# Patient Record
Sex: FEMALE | Race: BLACK OR AFRICAN AMERICAN | NOT HISPANIC OR LATINO
[De-identification: names, ages, dates, MRNs, and addresses within clinical notes are randomized per-mention and may not be internally consistent; named-entity substitution may affect disease eponyms.]

---

## 2017-01-09 ENCOUNTER — APPOINTMENT (OUTPATIENT)
Dept: PLASTIC SURGERY | Facility: CLINIC | Age: 47
End: 2017-01-09

## 2017-01-20 ENCOUNTER — OUTPATIENT (OUTPATIENT)
Dept: OUTPATIENT SERVICES | Facility: HOSPITAL | Age: 47
LOS: 1 days | Discharge: HOME | End: 2017-01-20

## 2017-01-20 DIAGNOSIS — C50.912 MALIGNANT NEOPLASM OF UNSPECIFIED SITE OF LEFT FEMALE BREAST: ICD-10-CM

## 2017-01-20 DIAGNOSIS — Z90.13 ACQUIRED ABSENCE OF BILATERAL BREASTS AND NIPPLES: Chronic | ICD-10-CM

## 2017-01-23 ENCOUNTER — RESULT REVIEW (OUTPATIENT)
Age: 47
End: 2017-01-23

## 2017-01-24 ENCOUNTER — APPOINTMENT (OUTPATIENT)
Dept: HEMATOLOGY ONCOLOGY | Facility: CLINIC | Age: 47
End: 2017-01-24

## 2017-01-24 VITALS
RESPIRATION RATE: 16 BRPM | WEIGHT: 172.62 LBS | TEMPERATURE: 98.8 F | SYSTOLIC BLOOD PRESSURE: 134 MMHG | BODY MASS INDEX: 29.63 KG/M2 | DIASTOLIC BLOOD PRESSURE: 90 MMHG | HEART RATE: 76 BPM | OXYGEN SATURATION: 100 %

## 2017-01-24 LAB
BASOPHILS # BLD AUTO: 0 K/UL — SIGNIFICANT CHANGE UP (ref 0–0.2)
BASOPHILS NFR BLD AUTO: 0.2 % — SIGNIFICANT CHANGE UP (ref 0–2)
EOSINOPHIL # BLD AUTO: 0 K/UL — SIGNIFICANT CHANGE UP (ref 0–0.5)
EOSINOPHIL NFR BLD AUTO: 1.1 % — SIGNIFICANT CHANGE UP (ref 0–6)
HCT VFR BLD CALC: 38.2 % — SIGNIFICANT CHANGE UP (ref 34.5–45)
HGB BLD-MCNC: 13 G/DL — SIGNIFICANT CHANGE UP (ref 11.5–15.5)
LYMPHOCYTES # BLD AUTO: 1.4 K/UL — SIGNIFICANT CHANGE UP (ref 1–3.3)
LYMPHOCYTES # BLD AUTO: 32.4 % — SIGNIFICANT CHANGE UP (ref 13–44)
MCHC RBC-ENTMCNC: 26.4 PG — LOW (ref 27–34)
MCHC RBC-ENTMCNC: 34.1 GM/DL — SIGNIFICANT CHANGE UP (ref 32–36)
MCV RBC AUTO: 77.3 FL — LOW (ref 80–100)
MONOCYTES # BLD AUTO: 0.3 K/UL — SIGNIFICANT CHANGE UP (ref 0–0.9)
MONOCYTES NFR BLD AUTO: 7.4 % — SIGNIFICANT CHANGE UP (ref 2–14)
NEUTROPHILS # BLD AUTO: 2.6 K/UL — SIGNIFICANT CHANGE UP (ref 1.8–7.4)
NEUTROPHILS NFR BLD AUTO: 59 % — SIGNIFICANT CHANGE UP (ref 43–77)
PLATELET # BLD AUTO: 201 K/UL — SIGNIFICANT CHANGE UP (ref 150–400)
RBC # BLD: 4.94 M/UL — SIGNIFICANT CHANGE UP (ref 3.8–5.2)
RBC # FLD: 12 % — SIGNIFICANT CHANGE UP (ref 10.3–14.5)
WBC # BLD: 4.3 K/UL — SIGNIFICANT CHANGE UP (ref 3.8–10.5)
WBC # FLD AUTO: 4.3 K/UL — SIGNIFICANT CHANGE UP (ref 3.8–10.5)

## 2017-01-27 LAB
ALBUMIN SERPL ELPH-MCNC: 4.4 G/DL
ALP BLD-CCNC: 55 U/L
ALT SERPL-CCNC: 25 U/L
ANION GAP SERPL CALC-SCNC: 15 MMOL/L
AST SERPL-CCNC: 24 U/L
BILIRUB SERPL-MCNC: 0.4 MG/DL
BUN SERPL-MCNC: 8 MG/DL
CALCIUM SERPL-MCNC: 9.5 MG/DL
CHLORIDE SERPL-SCNC: 107 MMOL/L
CO2 SERPL-SCNC: 24 MMOL/L
CREAT SERPL-MCNC: 0.7 MG/DL
FERRITIN SERPL-MCNC: 72.2 NG/ML
GLUCOSE SERPL-MCNC: 85 MG/DL
IRON SATN MFR SERPL: 22 %
IRON SERPL-MCNC: 67 UG/DL
POTASSIUM SERPL-SCNC: 4 MMOL/L
PROT SERPL-MCNC: 8.1 G/DL
SODIUM SERPL-SCNC: 146 MMOL/L
TIBC SERPL-MCNC: 308 UG/DL
TRANSFERRIN SERPL-MCNC: 279 MG/DL
UIBC SERPL-MCNC: 241 UG/DL

## 2017-04-05 ENCOUNTER — FORM ENCOUNTER (OUTPATIENT)
Age: 47
End: 2017-04-05

## 2017-04-06 ENCOUNTER — APPOINTMENT (OUTPATIENT)
Dept: SURGICAL ONCOLOGY | Facility: CLINIC | Age: 47
End: 2017-04-06

## 2017-04-06 ENCOUNTER — APPOINTMENT (OUTPATIENT)
Dept: RADIOLOGY | Facility: IMAGING CENTER | Age: 47
End: 2017-04-06

## 2017-04-06 ENCOUNTER — OUTPATIENT (OUTPATIENT)
Dept: OUTPATIENT SERVICES | Facility: HOSPITAL | Age: 47
LOS: 1 days | End: 2017-04-06
Payer: COMMERCIAL

## 2017-04-06 VITALS
WEIGHT: 170 LBS | DIASTOLIC BLOOD PRESSURE: 95 MMHG | SYSTOLIC BLOOD PRESSURE: 144 MMHG | HEART RATE: 79 BPM | BODY MASS INDEX: 27.32 KG/M2 | HEIGHT: 66 IN

## 2017-04-06 DIAGNOSIS — C50.912 MALIGNANT NEOPLASM OF UNSPECIFIED SITE OF LEFT FEMALE BREAST: ICD-10-CM

## 2017-04-06 DIAGNOSIS — Z90.13 ACQUIRED ABSENCE OF BILATERAL BREASTS AND NIPPLES: Chronic | ICD-10-CM

## 2017-04-06 PROCEDURE — 77080 DXA BONE DENSITY AXIAL: CPT

## 2017-04-28 ENCOUNTER — OUTPATIENT (OUTPATIENT)
Dept: OUTPATIENT SERVICES | Facility: HOSPITAL | Age: 47
LOS: 1 days | Discharge: HOME | End: 2017-04-28

## 2017-04-28 DIAGNOSIS — C50.912 MALIGNANT NEOPLASM OF UNSPECIFIED SITE OF LEFT FEMALE BREAST: ICD-10-CM

## 2017-04-28 DIAGNOSIS — Z90.13 ACQUIRED ABSENCE OF BILATERAL BREASTS AND NIPPLES: Chronic | ICD-10-CM

## 2017-05-02 ENCOUNTER — RESULT REVIEW (OUTPATIENT)
Age: 47
End: 2017-05-02

## 2017-05-03 ENCOUNTER — APPOINTMENT (OUTPATIENT)
Dept: HEMATOLOGY ONCOLOGY | Facility: CLINIC | Age: 47
End: 2017-05-03

## 2017-05-03 VITALS
SYSTOLIC BLOOD PRESSURE: 138 MMHG | BODY MASS INDEX: 28 KG/M2 | WEIGHT: 173.5 LBS | RESPIRATION RATE: 16 BRPM | DIASTOLIC BLOOD PRESSURE: 92 MMHG | OXYGEN SATURATION: 95 % | TEMPERATURE: 98.6 F | HEART RATE: 76 BPM

## 2017-05-03 DIAGNOSIS — Z51.81 ENCOUNTER FOR THERAPEUTIC DRUG LVL MONITORING: ICD-10-CM

## 2017-05-03 DIAGNOSIS — Z79.810 ENCOUNTER FOR THERAPEUTIC DRUG LVL MONITORING: ICD-10-CM

## 2017-05-03 DIAGNOSIS — Z85.3 ENCOUNTER FOR FOLLOW-UP EXAMINATION AFTER COMPLETED TREATMENT FOR MALIGNANT NEOPLASM: ICD-10-CM

## 2017-05-03 DIAGNOSIS — Z08 ENCOUNTER FOR FOLLOW-UP EXAMINATION AFTER COMPLETED TREATMENT FOR MALIGNANT NEOPLASM: ICD-10-CM

## 2017-05-03 LAB
ALBUMIN SERPL ELPH-MCNC: 4.1 G/DL
ALP BLD-CCNC: 51 U/L
ALT SERPL-CCNC: 11 U/L
ANION GAP SERPL CALC-SCNC: 12 MMOL/L
AST SERPL-CCNC: 17 U/L
BASOPHILS # BLD AUTO: 0 K/UL — SIGNIFICANT CHANGE UP (ref 0–0.2)
BASOPHILS NFR BLD AUTO: 0.3 % — SIGNIFICANT CHANGE UP (ref 0–2)
BILIRUB SERPL-MCNC: 0.3 MG/DL
BUN SERPL-MCNC: 8 MG/DL
CALCIUM SERPL-MCNC: 9.3 MG/DL
CHLORIDE SERPL-SCNC: 106 MMOL/L
CO2 SERPL-SCNC: 26 MMOL/L
CREAT SERPL-MCNC: 0.69 MG/DL
EOSINOPHIL # BLD AUTO: 0.1 K/UL — SIGNIFICANT CHANGE UP (ref 0–0.5)
EOSINOPHIL NFR BLD AUTO: 2.1 % — SIGNIFICANT CHANGE UP (ref 0–6)
GLUCOSE SERPL-MCNC: 92 MG/DL
HCT VFR BLD CALC: 35.8 % — SIGNIFICANT CHANGE UP (ref 34.5–45)
HGB BLD-MCNC: 12.2 G/DL — SIGNIFICANT CHANGE UP (ref 11.5–15.5)
LYMPHOCYTES # BLD AUTO: 1.3 K/UL — SIGNIFICANT CHANGE UP (ref 1–3.3)
LYMPHOCYTES # BLD AUTO: 35.6 % — SIGNIFICANT CHANGE UP (ref 13–44)
MCHC RBC-ENTMCNC: 26.2 PG — LOW (ref 27–34)
MCHC RBC-ENTMCNC: 34 G/DL — SIGNIFICANT CHANGE UP (ref 32–36)
MCV RBC AUTO: 77.2 FL — LOW (ref 80–100)
MONOCYTES # BLD AUTO: 0.4 K/UL — SIGNIFICANT CHANGE UP (ref 0–0.9)
MONOCYTES NFR BLD AUTO: 9.7 % — SIGNIFICANT CHANGE UP (ref 2–14)
NEUTROPHILS # BLD AUTO: 2 K/UL — SIGNIFICANT CHANGE UP (ref 1.8–7.4)
NEUTROPHILS NFR BLD AUTO: 52.3 % — SIGNIFICANT CHANGE UP (ref 43–77)
PLATELET # BLD AUTO: 223 K/UL — SIGNIFICANT CHANGE UP (ref 150–400)
POTASSIUM SERPL-SCNC: 4.2 MMOL/L
PROT SERPL-MCNC: 7.8 G/DL
RBC # BLD: 4.63 M/UL — SIGNIFICANT CHANGE UP (ref 3.8–5.2)
RBC # FLD: 12.3 % — SIGNIFICANT CHANGE UP (ref 10.3–14.5)
SODIUM SERPL-SCNC: 144 MMOL/L
WBC # BLD: 3.8 K/UL — SIGNIFICANT CHANGE UP (ref 3.8–10.5)
WBC # FLD AUTO: 3.8 K/UL — SIGNIFICANT CHANGE UP (ref 3.8–10.5)

## 2017-05-08 ENCOUNTER — APPOINTMENT (OUTPATIENT)
Dept: OBGYN | Facility: CLINIC | Age: 47
End: 2017-05-08

## 2017-05-08 VITALS
HEIGHT: 66 IN | WEIGHT: 180 LBS | DIASTOLIC BLOOD PRESSURE: 103 MMHG | SYSTOLIC BLOOD PRESSURE: 155 MMHG | BODY MASS INDEX: 28.93 KG/M2 | HEART RATE: 82 BPM

## 2017-05-09 LAB
FSH SERPL-MCNC: 29.2 IU/L
LH SERPL-ACNC: 17.4 IU/L
TSH SERPL-ACNC: 1.38 UIU/ML

## 2017-05-10 LAB — HPV HIGH+LOW RISK DNA PNL CVX: NEGATIVE

## 2017-05-12 LAB — CYTOLOGY CVX/VAG DOC THIN PREP: NORMAL

## 2017-07-03 ENCOUNTER — APPOINTMENT (OUTPATIENT)
Dept: PLASTIC SURGERY | Facility: CLINIC | Age: 47
End: 2017-07-03

## 2017-09-07 ENCOUNTER — OUTPATIENT (OUTPATIENT)
Dept: OUTPATIENT SERVICES | Facility: HOSPITAL | Age: 47
LOS: 1 days | Discharge: HOME | End: 2017-09-07

## 2017-09-07 DIAGNOSIS — C50.912 MALIGNANT NEOPLASM OF UNSPECIFIED SITE OF LEFT FEMALE BREAST: ICD-10-CM

## 2017-09-07 DIAGNOSIS — Z90.13 ACQUIRED ABSENCE OF BILATERAL BREASTS AND NIPPLES: Chronic | ICD-10-CM

## 2017-09-08 ENCOUNTER — APPOINTMENT (OUTPATIENT)
Dept: HEMATOLOGY ONCOLOGY | Facility: CLINIC | Age: 47
End: 2017-09-08
Payer: COMMERCIAL

## 2017-09-08 ENCOUNTER — RESULT REVIEW (OUTPATIENT)
Age: 47
End: 2017-09-08

## 2017-09-08 VITALS
SYSTOLIC BLOOD PRESSURE: 119 MMHG | TEMPERATURE: 98.9 F | HEART RATE: 78 BPM | BODY MASS INDEX: 27.43 KG/M2 | WEIGHT: 169.98 LBS | DIASTOLIC BLOOD PRESSURE: 82 MMHG | RESPIRATION RATE: 16 BRPM | OXYGEN SATURATION: 100 %

## 2017-09-08 LAB
BASOPHILS # BLD AUTO: 0 K/UL — SIGNIFICANT CHANGE UP (ref 0–0.2)
BASOPHILS NFR BLD AUTO: 0.2 % — SIGNIFICANT CHANGE UP (ref 0–2)
EOSINOPHIL # BLD AUTO: 0.1 K/UL — SIGNIFICANT CHANGE UP (ref 0–0.5)
EOSINOPHIL NFR BLD AUTO: 1.4 % — SIGNIFICANT CHANGE UP (ref 0–6)
HCT VFR BLD CALC: 36.3 % — SIGNIFICANT CHANGE UP (ref 34.5–45)
HGB BLD-MCNC: 12.5 G/DL — SIGNIFICANT CHANGE UP (ref 11.5–15.5)
LYMPHOCYTES # BLD AUTO: 1.8 K/UL — SIGNIFICANT CHANGE UP (ref 1–3.3)
LYMPHOCYTES # BLD AUTO: 38.7 % — SIGNIFICANT CHANGE UP (ref 13–44)
MCHC RBC-ENTMCNC: 26.8 PG — LOW (ref 27–34)
MCHC RBC-ENTMCNC: 34.4 G/DL — SIGNIFICANT CHANGE UP (ref 32–36)
MCV RBC AUTO: 77.7 FL — LOW (ref 80–100)
MONOCYTES # BLD AUTO: 0.4 K/UL — SIGNIFICANT CHANGE UP (ref 0–0.9)
MONOCYTES NFR BLD AUTO: 9 % — SIGNIFICANT CHANGE UP (ref 2–14)
NEUTROPHILS # BLD AUTO: 2.3 K/UL — SIGNIFICANT CHANGE UP (ref 1.8–7.4)
NEUTROPHILS NFR BLD AUTO: 50.7 % — SIGNIFICANT CHANGE UP (ref 43–77)
PLATELET # BLD AUTO: 216 K/UL — SIGNIFICANT CHANGE UP (ref 150–400)
RBC # BLD: 4.66 M/UL — SIGNIFICANT CHANGE UP (ref 3.8–5.2)
RBC # FLD: 12.4 % — SIGNIFICANT CHANGE UP (ref 10.3–14.5)
WBC # BLD: 4.5 K/UL — SIGNIFICANT CHANGE UP (ref 3.8–10.5)
WBC # FLD AUTO: 4.5 K/UL — SIGNIFICANT CHANGE UP (ref 3.8–10.5)

## 2017-09-08 PROCEDURE — 99215 OFFICE O/P EST HI 40 MIN: CPT

## 2017-09-08 RX ORDER — IBUPROFEN 600 MG/1
600 TABLET, FILM COATED ORAL
Qty: 9 | Refills: 0 | Status: DISCONTINUED | COMMUNITY
Start: 2017-05-27

## 2017-09-08 RX ORDER — ERGOCALCIFEROL 1.25 MG/1
1.25 MG CAPSULE, LIQUID FILLED ORAL
Qty: 13 | Refills: 0 | Status: DISCONTINUED | COMMUNITY
Start: 2017-08-11

## 2017-09-12 LAB
25(OH)D3 SERPL-MCNC: 33.1 NG/ML
ALBUMIN SERPL ELPH-MCNC: 4.1 G/DL
ALP BLD-CCNC: 46 U/L
ALT SERPL-CCNC: 9 U/L
ANION GAP SERPL CALC-SCNC: 13 MMOL/L
AST SERPL-CCNC: 17 U/L
BILIRUB SERPL-MCNC: 0.3 MG/DL
BUN SERPL-MCNC: 7 MG/DL
CALCIUM SERPL-MCNC: 9.4 MG/DL
CHLORIDE SERPL-SCNC: 104 MMOL/L
CO2 SERPL-SCNC: 28 MMOL/L
CREAT SERPL-MCNC: 0.67 MG/DL
GLUCOSE SERPL-MCNC: 84 MG/DL
POTASSIUM SERPL-SCNC: 3.9 MMOL/L
PROT SERPL-MCNC: 7.9 G/DL
SODIUM SERPL-SCNC: 145 MMOL/L

## 2018-02-27 ENCOUNTER — OUTPATIENT (OUTPATIENT)
Dept: OUTPATIENT SERVICES | Facility: HOSPITAL | Age: 48
LOS: 1 days | Discharge: HOME | End: 2018-02-27

## 2018-02-27 DIAGNOSIS — Z90.13 ACQUIRED ABSENCE OF BILATERAL BREASTS AND NIPPLES: Chronic | ICD-10-CM

## 2018-02-27 DIAGNOSIS — C50.912 MALIGNANT NEOPLASM OF UNSPECIFIED SITE OF LEFT FEMALE BREAST: ICD-10-CM

## 2018-03-05 ENCOUNTER — RESULT REVIEW (OUTPATIENT)
Age: 48
End: 2018-03-05

## 2018-03-05 ENCOUNTER — APPOINTMENT (OUTPATIENT)
Dept: HEMATOLOGY ONCOLOGY | Facility: CLINIC | Age: 48
End: 2018-03-05
Payer: COMMERCIAL

## 2018-03-05 VITALS
SYSTOLIC BLOOD PRESSURE: 130 MMHG | OXYGEN SATURATION: 98 % | RESPIRATION RATE: 16 BRPM | TEMPERATURE: 99 F | WEIGHT: 164.46 LBS | DIASTOLIC BLOOD PRESSURE: 76 MMHG | BODY MASS INDEX: 26.55 KG/M2 | HEART RATE: 76 BPM

## 2018-03-05 LAB
BASOPHILS # BLD AUTO: 0 K/UL — SIGNIFICANT CHANGE UP (ref 0–0.2)
BASOPHILS NFR BLD AUTO: 0.8 % — SIGNIFICANT CHANGE UP (ref 0–2)
EOSINOPHIL # BLD AUTO: 0.1 K/UL — SIGNIFICANT CHANGE UP (ref 0–0.5)
EOSINOPHIL NFR BLD AUTO: 2.1 % — SIGNIFICANT CHANGE UP (ref 0–6)
HCT VFR BLD CALC: 36.4 % — SIGNIFICANT CHANGE UP (ref 34.5–45)
HGB BLD-MCNC: 12.4 G/DL — SIGNIFICANT CHANGE UP (ref 11.5–15.5)
LYMPHOCYTES # BLD AUTO: 1.7 K/UL — SIGNIFICANT CHANGE UP (ref 1–3.3)
LYMPHOCYTES # BLD AUTO: 40.1 % — SIGNIFICANT CHANGE UP (ref 13–44)
MCHC RBC-ENTMCNC: 26.4 PG — LOW (ref 27–34)
MCHC RBC-ENTMCNC: 34.1 G/DL — SIGNIFICANT CHANGE UP (ref 32–36)
MCV RBC AUTO: 77.4 FL — LOW (ref 80–100)
MONOCYTES # BLD AUTO: 0.3 K/UL — SIGNIFICANT CHANGE UP (ref 0–0.9)
MONOCYTES NFR BLD AUTO: 6.3 % — SIGNIFICANT CHANGE UP (ref 2–14)
NEUTROPHILS # BLD AUTO: 2.2 K/UL — SIGNIFICANT CHANGE UP (ref 1.8–7.4)
NEUTROPHILS NFR BLD AUTO: 50.8 % — SIGNIFICANT CHANGE UP (ref 43–77)
PLATELET # BLD AUTO: 214 K/UL — SIGNIFICANT CHANGE UP (ref 150–400)
RBC # BLD: 4.7 M/UL — SIGNIFICANT CHANGE UP (ref 3.8–5.2)
RBC # FLD: 12.1 % — SIGNIFICANT CHANGE UP (ref 10.3–14.5)
WBC # BLD: 4.3 K/UL — SIGNIFICANT CHANGE UP (ref 3.8–10.5)
WBC # FLD AUTO: 4.3 K/UL — SIGNIFICANT CHANGE UP (ref 3.8–10.5)

## 2018-03-05 PROCEDURE — 99215 OFFICE O/P EST HI 40 MIN: CPT

## 2018-03-07 LAB
25(OH)D3 SERPL-MCNC: 25.4 NG/ML
ALBUMIN SERPL ELPH-MCNC: 4.3 G/DL
ALP BLD-CCNC: 51 U/L
ALT SERPL-CCNC: 10 U/L
ANION GAP SERPL CALC-SCNC: 19 MMOL/L
AST SERPL-CCNC: 17 U/L
BILIRUB SERPL-MCNC: 0.3 MG/DL
BUN SERPL-MCNC: 11 MG/DL
CALCIUM SERPL-MCNC: 10 MG/DL
CHLORIDE SERPL-SCNC: 104 MMOL/L
CO2 SERPL-SCNC: 22 MMOL/L
CREAT SERPL-MCNC: 0.84 MG/DL
ESTRADIOL SERPL-MCNC: <5 PG/ML
FSH SERPL-MCNC: 29 IU/L
GLUCOSE SERPL-MCNC: 87 MG/DL
POTASSIUM SERPL-SCNC: 4.2 MMOL/L
PROT SERPL-MCNC: 8.1 G/DL
SODIUM SERPL-SCNC: 145 MMOL/L

## 2018-03-15 LAB
APPEARANCE: CLEAR
BILIRUBIN URINE: NEGATIVE
BLOOD URINE: NEGATIVE
COLOR: YELLOW
GLUCOSE QUALITATIVE U: NEGATIVE MG/DL
KETONES URINE: ABNORMAL
LEUKOCYTE ESTERASE URINE: NEGATIVE
NITRITE URINE: NEGATIVE
PH URINE: 6.5
PROTEIN URINE: NEGATIVE MG/DL
SPECIFIC GRAVITY URINE: 1.02
UROBILINOGEN URINE: 1 MG/DL

## 2018-04-04 ENCOUNTER — APPOINTMENT (OUTPATIENT)
Dept: OBGYN | Facility: CLINIC | Age: 48
End: 2018-04-04
Payer: COMMERCIAL

## 2018-04-04 VITALS
BODY MASS INDEX: 26.36 KG/M2 | DIASTOLIC BLOOD PRESSURE: 91 MMHG | HEART RATE: 79 BPM | SYSTOLIC BLOOD PRESSURE: 142 MMHG | HEIGHT: 66 IN | WEIGHT: 164 LBS

## 2018-04-04 PROCEDURE — 99213 OFFICE O/P EST LOW 20 MIN: CPT

## 2018-04-07 LAB — BACTERIA UR CULT: NORMAL

## 2018-05-18 ENCOUNTER — APPOINTMENT (OUTPATIENT)
Dept: OBGYN | Facility: CLINIC | Age: 48
End: 2018-05-18
Payer: COMMERCIAL

## 2018-05-18 DIAGNOSIS — R19.8 OTHER SPECIFIED SYMPTOMS AND SIGNS INVOLVING THE DIGESTIVE SYSTEM AND ABDOMEN: ICD-10-CM

## 2018-05-18 DIAGNOSIS — K59.09 OTHER CONSTIPATION: ICD-10-CM

## 2018-05-18 PROCEDURE — 99214 OFFICE O/P EST MOD 30 MIN: CPT | Mod: 25

## 2018-05-18 PROCEDURE — 51798 US URINE CAPACITY MEASURE: CPT

## 2018-05-19 LAB
APPEARANCE: CLEAR
BACTERIA: NEGATIVE
BILIRUBIN URINE: NEGATIVE
BLOOD URINE: NEGATIVE
COLOR: YELLOW
GLUCOSE QUALITATIVE U: NEGATIVE MG/DL
KETONES URINE: NEGATIVE
LEUKOCYTE ESTERASE URINE: NEGATIVE
MICROSCOPIC-UA: NORMAL
NITRITE URINE: NEGATIVE
PH URINE: 6
PROTEIN URINE: NEGATIVE MG/DL
RED BLOOD CELLS URINE: 6 /HPF
SPECIFIC GRAVITY URINE: 1.02
SQUAMOUS EPITHELIAL CELLS: 0 /HPF
UROBILINOGEN URINE: NEGATIVE MG/DL
WHITE BLOOD CELLS URINE: 0 /HPF

## 2018-05-21 LAB — CORE LAB FLUID CYTOLOGY: NORMAL

## 2018-06-06 ENCOUNTER — APPOINTMENT (OUTPATIENT)
Dept: ULTRASOUND IMAGING | Facility: IMAGING CENTER | Age: 48
End: 2018-06-06
Payer: COMMERCIAL

## 2018-06-06 ENCOUNTER — OUTPATIENT (OUTPATIENT)
Dept: OUTPATIENT SERVICES | Facility: HOSPITAL | Age: 48
LOS: 1 days | End: 2018-06-06
Payer: COMMERCIAL

## 2018-06-06 DIAGNOSIS — N31.8 OTHER NEUROMUSCULAR DYSFUNCTION OF BLADDER: ICD-10-CM

## 2018-06-06 DIAGNOSIS — Z90.13 ACQUIRED ABSENCE OF BILATERAL BREASTS AND NIPPLES: Chronic | ICD-10-CM

## 2018-06-06 PROCEDURE — 76700 US EXAM ABDOM COMPLETE: CPT

## 2018-06-06 PROCEDURE — 76700 US EXAM ABDOM COMPLETE: CPT | Mod: 26

## 2018-06-06 PROCEDURE — 76856 US EXAM PELVIC COMPLETE: CPT

## 2018-06-06 PROCEDURE — 76830 TRANSVAGINAL US NON-OB: CPT | Mod: 26

## 2018-06-06 PROCEDURE — 76856 US EXAM PELVIC COMPLETE: CPT | Mod: 26,59

## 2018-06-06 PROCEDURE — 76830 TRANSVAGINAL US NON-OB: CPT

## 2018-07-06 ENCOUNTER — APPOINTMENT (OUTPATIENT)
Dept: OBGYN | Facility: CLINIC | Age: 48
End: 2018-07-06
Payer: COMMERCIAL

## 2018-07-06 VITALS
DIASTOLIC BLOOD PRESSURE: 90 MMHG | SYSTOLIC BLOOD PRESSURE: 130 MMHG | HEART RATE: 86 BPM | HEIGHT: 66 IN | WEIGHT: 168 LBS | BODY MASS INDEX: 27 KG/M2

## 2018-07-06 DIAGNOSIS — N31.8 OTHER NEUROMUSCULAR DYSFUNCTION OF BLADDER: ICD-10-CM

## 2018-07-06 PROCEDURE — 51798 US URINE CAPACITY MEASURE: CPT

## 2018-07-06 PROCEDURE — 99214 OFFICE O/P EST MOD 30 MIN: CPT | Mod: 25

## 2018-07-07 LAB
APPEARANCE: CLEAR
BACTERIA: NEGATIVE
BILIRUBIN URINE: NEGATIVE
BLOOD URINE: NEGATIVE
COLOR: YELLOW
GLUCOSE QUALITATIVE U: NEGATIVE MG/DL
HYALINE CASTS: 2 /LPF
KETONES URINE: NEGATIVE
LEUKOCYTE ESTERASE URINE: NEGATIVE
MICROSCOPIC-UA: NORMAL
NITRITE URINE: NEGATIVE
PH URINE: 5.5
PROTEIN URINE: NEGATIVE MG/DL
RED BLOOD CELLS URINE: 4 /HPF
SPECIFIC GRAVITY URINE: 1.02
SQUAMOUS EPITHELIAL CELLS: 1 /HPF
UROBILINOGEN URINE: NEGATIVE MG/DL
WHITE BLOOD CELLS URINE: 1 /HPF

## 2018-07-08 LAB — BACTERIA UR CULT: NORMAL

## 2018-07-09 LAB — CORE LAB FLUID CYTOLOGY: NORMAL

## 2018-08-29 ENCOUNTER — OUTPATIENT (OUTPATIENT)
Dept: OUTPATIENT SERVICES | Facility: HOSPITAL | Age: 48
LOS: 1 days | Discharge: HOME | End: 2018-08-29

## 2018-08-29 DIAGNOSIS — C50.912 MALIGNANT NEOPLASM OF UNSPECIFIED SITE OF LEFT FEMALE BREAST: ICD-10-CM

## 2018-08-29 DIAGNOSIS — Z90.13 ACQUIRED ABSENCE OF BILATERAL BREASTS AND NIPPLES: Chronic | ICD-10-CM

## 2018-09-07 ENCOUNTER — APPOINTMENT (OUTPATIENT)
Dept: HEMATOLOGY ONCOLOGY | Facility: CLINIC | Age: 48
End: 2018-09-07

## 2018-09-28 ENCOUNTER — APPOINTMENT (OUTPATIENT)
Dept: HEMATOLOGY ONCOLOGY | Facility: CLINIC | Age: 48
End: 2018-09-28
Payer: COMMERCIAL

## 2018-09-28 ENCOUNTER — RESULT REVIEW (OUTPATIENT)
Age: 48
End: 2018-09-28

## 2018-09-28 VITALS
DIASTOLIC BLOOD PRESSURE: 85 MMHG | BODY MASS INDEX: 27.04 KG/M2 | OXYGEN SATURATION: 99 % | HEART RATE: 76 BPM | RESPIRATION RATE: 16 BRPM | SYSTOLIC BLOOD PRESSURE: 129 MMHG | TEMPERATURE: 97.9 F | WEIGHT: 167.55 LBS

## 2018-09-28 DIAGNOSIS — Z17.0 MALIGNANT NEOPLASM OF UNSPECIFIED SITE OF LEFT FEMALE BREAST: ICD-10-CM

## 2018-09-28 DIAGNOSIS — C50.912 MALIGNANT NEOPLASM OF UNSPECIFIED SITE OF LEFT FEMALE BREAST: ICD-10-CM

## 2018-09-28 LAB
BASOPHILS # BLD AUTO: 0 K/UL — SIGNIFICANT CHANGE UP (ref 0–0.2)
BASOPHILS NFR BLD AUTO: 0.3 % — SIGNIFICANT CHANGE UP (ref 0–2)
EOSINOPHIL # BLD AUTO: 0.2 K/UL — SIGNIFICANT CHANGE UP (ref 0–0.5)
EOSINOPHIL NFR BLD AUTO: 3.6 % — SIGNIFICANT CHANGE UP (ref 0–6)
HCT VFR BLD CALC: 37.3 % — SIGNIFICANT CHANGE UP (ref 34.5–45)
HGB BLD-MCNC: 12.2 G/DL — SIGNIFICANT CHANGE UP (ref 11.5–15.5)
LYMPHOCYTES # BLD AUTO: 2 K/UL — SIGNIFICANT CHANGE UP (ref 1–3.3)
LYMPHOCYTES # BLD AUTO: 39.4 % — SIGNIFICANT CHANGE UP (ref 13–44)
MCHC RBC-ENTMCNC: 25 PG — LOW (ref 27–34)
MCHC RBC-ENTMCNC: 32.6 G/DL — SIGNIFICANT CHANGE UP (ref 32–36)
MCV RBC AUTO: 76.6 FL — LOW (ref 80–100)
MONOCYTES # BLD AUTO: 0.3 K/UL — SIGNIFICANT CHANGE UP (ref 0–0.9)
MONOCYTES NFR BLD AUTO: 6.4 % — SIGNIFICANT CHANGE UP (ref 2–14)
NEUTROPHILS # BLD AUTO: 2.6 K/UL — SIGNIFICANT CHANGE UP (ref 1.8–7.4)
NEUTROPHILS NFR BLD AUTO: 50.4 % — SIGNIFICANT CHANGE UP (ref 43–77)
PLATELET # BLD AUTO: 209 K/UL — SIGNIFICANT CHANGE UP (ref 150–400)
RBC # BLD: 4.87 M/UL — SIGNIFICANT CHANGE UP (ref 3.8–5.2)
RBC # FLD: 12.1 % — SIGNIFICANT CHANGE UP (ref 10.3–14.5)
WBC # BLD: 5.1 K/UL — SIGNIFICANT CHANGE UP (ref 3.8–10.5)
WBC # FLD AUTO: 5.1 K/UL — SIGNIFICANT CHANGE UP (ref 3.8–10.5)

## 2018-09-28 PROCEDURE — 99215 OFFICE O/P EST HI 40 MIN: CPT

## 2018-09-28 RX ORDER — TAMOXIFEN CITRATE 20 MG/1
20 TABLET, FILM COATED ORAL
Qty: 90 | Refills: 1 | Status: DISCONTINUED | COMMUNITY
Start: 2017-01-24 | End: 2018-09-28

## 2018-10-01 LAB
25(OH)D3 SERPL-MCNC: 25.3 NG/ML
ALBUMIN SERPL ELPH-MCNC: 4.5 G/DL
ALP BLD-CCNC: 49 U/L
ALT SERPL-CCNC: 11 U/L
ANION GAP SERPL CALC-SCNC: 14 MMOL/L
AST SERPL-CCNC: 20 U/L
BILIRUB SERPL-MCNC: 0.2 MG/DL
BUN SERPL-MCNC: 8 MG/DL
CALCIUM SERPL-MCNC: 9.3 MG/DL
CHLORIDE SERPL-SCNC: 105 MMOL/L
CO2 SERPL-SCNC: 26 MMOL/L
CREAT SERPL-MCNC: 0.75 MG/DL
GLUCOSE SERPL-MCNC: 94 MG/DL
POTASSIUM SERPL-SCNC: 3.9 MMOL/L
PROT SERPL-MCNC: 7.8 G/DL
SODIUM SERPL-SCNC: 145 MMOL/L

## 2019-01-10 ENCOUNTER — OUTPATIENT (OUTPATIENT)
Dept: OUTPATIENT SERVICES | Facility: HOSPITAL | Age: 49
LOS: 1 days | Discharge: HOME | End: 2019-01-10

## 2019-01-10 DIAGNOSIS — C50.912 MALIGNANT NEOPLASM OF UNSPECIFIED SITE OF LEFT FEMALE BREAST: ICD-10-CM

## 2019-01-10 DIAGNOSIS — Z90.13 ACQUIRED ABSENCE OF BILATERAL BREASTS AND NIPPLES: Chronic | ICD-10-CM

## 2019-01-14 ENCOUNTER — APPOINTMENT (OUTPATIENT)
Dept: HEMATOLOGY ONCOLOGY | Facility: CLINIC | Age: 49
End: 2019-01-14

## 2019-01-14 ENCOUNTER — APPOINTMENT (OUTPATIENT)
Dept: HEMATOLOGY ONCOLOGY | Facility: CLINIC | Age: 49
End: 2019-01-14
Payer: COMMERCIAL

## 2019-01-14 VITALS
TEMPERATURE: 99 F | SYSTOLIC BLOOD PRESSURE: 142 MMHG | OXYGEN SATURATION: 100 % | DIASTOLIC BLOOD PRESSURE: 91 MMHG | BODY MASS INDEX: 27.28 KG/M2 | RESPIRATION RATE: 16 BRPM | HEART RATE: 77 BPM | WEIGHT: 168.98 LBS

## 2019-01-14 DIAGNOSIS — D05.12 INTRADUCTAL CARCINOMA IN SITU OF LEFT BREAST: ICD-10-CM

## 2019-01-14 PROCEDURE — 99215 OFFICE O/P EST HI 40 MIN: CPT

## 2019-01-17 NOTE — REASON FOR VISIT
[Follow-Up Visit] : a follow-up [Parent] : parent [FreeTextEntry2] : breast cancer ER/TX Positive, HER-2 NEGATIVE

## 2019-01-17 NOTE — ASSESSMENT
[Curative] : Goals of care discussed with patient: Curative [FreeTextEntry1] : Ms. Bermeo is a 49 y/o female with stage IA ([id number]) moderately differentiated ER+/TN+/HER2- invasive ductal carcinoma of the left breast s/p bilateral mastectomy, SLNB and ZANDER reconstruction 1/18/16, Oncotype 32, completed adjuvant chemotherapy with TCx4, on tamoxifen (8/1/16-9/2018), switched to anastrozole 9/2018, here for f/u. \par \par -  Breast ca- on anastrozole but reporting side effects- pain and hot flashes. She has Aromatase inhibitor induced arthralgia : Severe pain interfering with daily activities. I suspect it is secondary to anastrozole. I recommend 3 weeks break and see me in 3 weeks to discuss further plan. If pain subsides, would switch to exemestane. If pain doesn’t subside, she would benefit from ortho intervention. Would obtain a bone scan to r/o possibility of bone mets ( less likely) if pain doesn’t subside. \par - DEXA every 2 yrs. Continue ca+ vit D\par - Lipid profile annually with PCP\par - GYN f/u due to h/o tamoxifen use.\par - No breast imaging needed\par - Continue to followup with primary care for HTN control\par \par RTC 3w

## 2019-01-17 NOTE — HISTORY OF PRESENT ILLNESS
[Disease: _____________________] : Disease: [unfilled] [T: ___] : T[unfilled] [N: ___] : N[unfilled] [M: ___] : M[unfilled] [AJCC Stage: ____] : AJCC Stage: [unfilled] [IA] : IA [de-identified] : Ms. Bermeo is a 47 y/o female with stage IA invasive ductal carcinoma of left breast, ER+/CT+/HER2- s/p bilateral mastectomy and adjuvant chemotherapy with TC x4 here for f/u. Her oncologic history is as follows: \par \par She underwent a screening mammography on 10/16/2015 because of a lump she had palpated in the left breast, which revealed two clusters of heterogeneous microcalcifications in the left breast.  This was followed by an ultrasound  which revealed a 0.9 x 1.3 x 0.6cm poorly marginated/microlobulated, heterogeneously hypoechoic nodule in the left breast, directly beneath the region of interest of the palpable lump, 9:00 position 1 cm from the nipple. She underwent a core biopsy of the nodule on 11/4/2015 which revealed DCIS, solid and cribriform patterns, high grade nuclear atypia and focal necrosis. ER 60%, CT 30%. This biopsy was followed by stereotactic core biopsies\par of the two groupings of calcifications in the left breast on 11/6/2015. Biopsy of ''left 9:00 anterior" revealed  DCIS and biopsy of "left 9:00 posterior" revealed grade 2 IDC along with DCIS. ER 95%, CT 95%, HER2 0/1+. ER/CT repeated on DCIS (but unclear if on anterior or posterior block): ER 95%, CT 95%.\par \par Following the biopsies, she had a bilateral breast MRI on 11/19/15.  The left breast revealed a large ~5cm area of nonmass enhancement extending from the upper inner breast, involving the palpable area. Additionally, there was a 5.3cm rim enhancing mass at the superior posterior central left breast likely a combination of post biopsy and hematoma and residual disease. 4 mm focus of enhancement lower outer left breast. The right breast demonstrated a 11 mm heterogeneous focal nonmass enhancement central right breast. \par Bilateral prominent axillary lymph nodes. She underwent biopsies of both axillary LNs on 12/9/15: right LN: fragmented, unremarkable LN. Left LN: unremarkable breast tissue, no lymphoid tissue identified. She also had a MRI guided core biopsy of the right breast on 12/11/15: benign.  \par \par 1/18/16- Bilateral mastectomy (patient preference; genetics neg) and SLNB with ZANDER reconstruction-  \par Right mastectomy: proliferative fibrocystic changes with usual ductal hyperplasia, calcification, biopsy site changes, columnar cell change, sclerosing adenosis and stromal fibrosis, 0/3 nodes\par Left mastectomy: Invasive ductal carcinoma, stage IA ([id number])\par Size: 3 foci, largest 9 mm (others 4mm, 5mm)   stGstrstastdstest:st st1st LNs: Negative (0/3 sentinel LNs, 0/2 internal mammary LNs)  DCIS: Present  Margins: Negative  LVI: absent \par ER 80%, CT 90%, HER2 negative \par Oncotype: 32\par  \par Adjuvant chemotherapy: TC x4 (3/11/16-5/13/16)\par Tamoxifen (8/1/16-)\par \par Her medical history is notable for iron deficiency anemia for which she received IV iron in November 2015.  She had a CT c/a/p, EGD and colonoscopy, all of which were reportedly normal. She states she has had iron deficiency in the past but never to this degree. No clear etiology of iron deficiency so far. 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 Sherice Ellison for evaluation of hypercoagulable state prior to surgery. Hypercoagulable w/u negative but given her history, prophylactic lovenox was recommended for 4 weeks postop.  [de-identified] : Moderately differentiated invasive ductal carcinoma [de-identified] : ER 80%, NC 90%, HER2 negative  [de-identified] : Genetics: CancerNext 32 Gene Panel from [facility]: Negative  [FreeTextEntry1] : Tamoxifen 8/2016- 9/2018; Anastrazole since 9/2018 [de-identified] : Ms. BRE GORE is here for a follow up of left breast cancer on endocrine therapy since 9/2018. She was switched to anastrozole at last visit. She reports diffuse arthralgias x 1 m. + hot flashes. \par LMP date: 2 yrs ago , no spotting, no vaginal bleeding\par Dexa scan 4/2017: normal cont ca and vit d Due 4/2019\par no need for breast imaging\par Takes care of her family is very busy. Has a 6 yr old, 12 yrs, 24 and 30 yrs old\par

## 2019-01-17 NOTE — PHYSICAL EXAM
[Fully active, able to carry on all pre-disease performance without restriction] : Status 0 - Fully active, able to carry on all pre-disease performance without restriction [Normal] : affect appropriate [de-identified] : S/p bilateral mastectomy and ZANDER reconstruction. No masses or axillary adenopathy palpated bilaterally. . [de-identified] : Mild left arm swelling

## 2019-01-28 ENCOUNTER — APPOINTMENT (OUTPATIENT)
Dept: OBGYN | Facility: CLINIC | Age: 49
End: 2019-01-28

## 2019-01-28 ENCOUNTER — RESULT REVIEW (OUTPATIENT)
Age: 49
End: 2019-01-28

## 2019-01-28 ENCOUNTER — APPOINTMENT (OUTPATIENT)
Dept: HEMATOLOGY ONCOLOGY | Facility: CLINIC | Age: 49
End: 2019-01-28
Payer: COMMERCIAL

## 2019-01-28 VITALS
BODY MASS INDEX: 27.75 KG/M2 | TEMPERATURE: 98.2 F | OXYGEN SATURATION: 98 % | DIASTOLIC BLOOD PRESSURE: 76 MMHG | HEART RATE: 80 BPM | WEIGHT: 171.96 LBS | SYSTOLIC BLOOD PRESSURE: 124 MMHG | RESPIRATION RATE: 16 BRPM

## 2019-01-28 DIAGNOSIS — Z79.811 LONG TERM (CURRENT) USE OF AROMATASE INHIBITORS: ICD-10-CM

## 2019-01-28 LAB
BASOPHILS # BLD AUTO: 0 K/UL — SIGNIFICANT CHANGE UP (ref 0–0.2)
BASOPHILS NFR BLD AUTO: 0.1 % — SIGNIFICANT CHANGE UP (ref 0–2)
EOSINOPHIL # BLD AUTO: 0.1 K/UL — SIGNIFICANT CHANGE UP (ref 0–0.5)
EOSINOPHIL NFR BLD AUTO: 1.9 % — SIGNIFICANT CHANGE UP (ref 0–6)
HCT VFR BLD CALC: 36.4 % — SIGNIFICANT CHANGE UP (ref 34.5–45)
HGB BLD-MCNC: 12.1 G/DL — SIGNIFICANT CHANGE UP (ref 11.5–15.5)
LYMPHOCYTES # BLD AUTO: 1.6 K/UL — SIGNIFICANT CHANGE UP (ref 1–3.3)
LYMPHOCYTES # BLD AUTO: 32.5 % — SIGNIFICANT CHANGE UP (ref 13–44)
MCHC RBC-ENTMCNC: 25.5 PG — LOW (ref 27–34)
MCHC RBC-ENTMCNC: 33.3 G/DL — SIGNIFICANT CHANGE UP (ref 32–36)
MCV RBC AUTO: 76.4 FL — LOW (ref 80–100)
MONOCYTES # BLD AUTO: 0.4 K/UL — SIGNIFICANT CHANGE UP (ref 0–0.9)
MONOCYTES NFR BLD AUTO: 7.6 % — SIGNIFICANT CHANGE UP (ref 2–14)
NEUTROPHILS # BLD AUTO: 2.9 K/UL — SIGNIFICANT CHANGE UP (ref 1.8–7.4)
NEUTROPHILS NFR BLD AUTO: 57.8 % — SIGNIFICANT CHANGE UP (ref 43–77)
PLATELET # BLD AUTO: 236 K/UL — SIGNIFICANT CHANGE UP (ref 150–400)
RBC # BLD: 4.77 M/UL — SIGNIFICANT CHANGE UP (ref 3.8–5.2)
RBC # FLD: 12.2 % — SIGNIFICANT CHANGE UP (ref 10.3–14.5)
WBC # BLD: 5 K/UL — SIGNIFICANT CHANGE UP (ref 3.8–10.5)
WBC # FLD AUTO: 5 K/UL — SIGNIFICANT CHANGE UP (ref 3.8–10.5)

## 2019-01-28 PROCEDURE — 99215 OFFICE O/P EST HI 40 MIN: CPT

## 2019-01-29 PROBLEM — Z79.811 USE OF ANASTROZOLE: Status: ACTIVE | Noted: 2019-01-13

## 2019-01-29 LAB
25(OH)D3 SERPL-MCNC: 34.1 NG/ML
ALBUMIN SERPL ELPH-MCNC: 4.6 G/DL
ALP BLD-CCNC: 56 U/L
ALT SERPL-CCNC: 15 U/L
ANION GAP SERPL CALC-SCNC: 10 MMOL/L
AST SERPL-CCNC: 18 U/L
BILIRUB SERPL-MCNC: 0.2 MG/DL
BUN SERPL-MCNC: 8 MG/DL
CALCIUM SERPL-MCNC: 9.3 MG/DL
CHLORIDE SERPL-SCNC: 106 MMOL/L
CO2 SERPL-SCNC: 26 MMOL/L
CREAT SERPL-MCNC: 0.69 MG/DL
GLUCOSE SERPL-MCNC: 90 MG/DL
POTASSIUM SERPL-SCNC: 4 MMOL/L
PROT SERPL-MCNC: 8 G/DL
SODIUM SERPL-SCNC: 143 MMOL/L

## 2019-01-29 RX ORDER — NITROFURANTOIN (MONOHYDRATE/MACROCRYSTALS) 25; 75 MG/1; MG/1
100 CAPSULE ORAL
Qty: 6 | Refills: 0 | Status: DISCONTINUED | COMMUNITY
Start: 2018-10-25

## 2019-02-02 NOTE — HISTORY OF PRESENT ILLNESS
[Disease: _____________________] : Disease: [unfilled] [T: ___] : T[unfilled] [N: ___] : N[unfilled] [M: ___] : M[unfilled] [AJCC Stage: ____] : AJCC Stage: [unfilled] [IA] : IA [de-identified] : Ms. Bermeo is a 49 y/o female with stage IA invasive ductal carcinoma of left breast, ER+/IA+/HER2- s/p bilateral mastectomy and adjuvant chemotherapy with TC x4 here for f/u. Her oncologic history is as follows: \par \par She underwent a screening mammography on 10/16/2015 because of a lump she had palpated in the left breast, which revealed two clusters of heterogeneous microcalcifications in the left breast.  This was followed by an ultrasound  which revealed a 0.9 x 1.3 x 0.6cm poorly marginated/microlobulated, heterogeneously hypoechoic nodule in the left breast, directly beneath the region of interest of the palpable lump, 9:00 position 1 cm from the nipple. She underwent a core biopsy of the nodule on 11/4/2015 which revealed DCIS, solid and cribriform patterns, high grade nuclear atypia and focal necrosis. ER 60%, IA 30%. This biopsy was followed by stereotactic core biopsies\par of the two groupings of calcifications in the left breast on 11/6/2015. Biopsy of ''left 9:00 anterior" revealed  DCIS and biopsy of "left 9:00 posterior" revealed grade 2 IDC along with DCIS. ER 95%, IA 95%, HER2 0/1+. ER/IA repeated on DCIS (but unclear if on anterior or posterior block): ER 95%, IA 95%.\par \par Following the biopsies, she had a bilateral breast MRI on 11/19/15.  The left breast revealed a large ~5cm area of nonmass enhancement extending from the upper inner breast, involving the palpable area. Additionally, there was a 5.3cm rim enhancing mass at the superior posterior central left breast likely a combination of post biopsy and hematoma and residual disease. 4 mm focus of enhancement lower outer left breast. The right breast demonstrated a 11 mm heterogeneous focal nonmass enhancement central right breast. \par Bilateral prominent axillary lymph nodes. She underwent biopsies of both axillary LNs on 12/9/15: right LN: fragmented, unremarkable LN. Left LN: unremarkable breast tissue, no lymphoid tissue identified. She also had a MRI guided core biopsy of the right breast on 12/11/15: benign.  \par \par 1/18/16- Bilateral mastectomy (patient preference; genetics neg) and SLNB with ZANDER reconstruction-  \par Right mastectomy: proliferative fibrocystic changes with usual ductal hyperplasia, calcification, biopsy site changes, columnar cell change, sclerosing adenosis and stromal fibrosis, 0/3 nodes\par Left mastectomy: Invasive ductal carcinoma, stage IA ([id number])\par Size: 3 foci, largest 9 mm (others 4mm, 5mm)   stGstrstastdstest:st st1st LNs: Negative (0/3 sentinel LNs, 0/2 internal mammary LNs)  DCIS: Present  Margins: Negative  LVI: absent \par ER 80%, IA 90%, HER2 negative \par Oncotype: 32\par  \par Adjuvant chemotherapy: TC x4 (3/11/16-5/13/16)\par Tamoxifen (8/1/16-)\par \par Her medical history is notable for iron deficiency anemia for which she received IV iron in November 2015.  She had a CT c/a/p, EGD and colonoscopy, all of which were reportedly normal. She states she has had iron deficiency in the past but never to this degree. No clear etiology of iron deficiency so far. 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 Sherice Ellison for evaluation of hypercoagulable state prior to surgery. Hypercoagulable w/u negative but given her history, prophylactic lovenox was recommended for 4 weeks postop.  [de-identified] : Moderately differentiated invasive ductal carcinoma [de-identified] : ER 80%, NE 90%, HER2 negative  [de-identified] : Genetics: CancerNext 32 Gene Panel from [facility]: Negative  [FreeTextEntry1] : Tamoxifen 8/2016- 9/2018; Anastrazole since 9/2018 [de-identified] : Ms. BRE GORE is here for a follow up of left breast cancer on endocrine therapy since 9/2018. \par She was switched to anastrozole in September. She was having increased left shoulder pain and achiness down her arm, with associated numbness of all 5 fingers. along with hot flashes. Her pain is 70% better since stopping medication for 2 weeks. She stopped OTC pain meds. She does continue to have mild pain that is constant especially when she tries to lift her arm. Hot flashes improved.\par LMP date: 2 yrs ago , no spotting, no vaginal bleeding\par Dexa scan 4/2017: normal cont ca and vit d Due 4/2019\par no need for breast imaging\par Takes care of her family is very busy. Has a 6 yr old, 12 yrs, 24 and 30 yrs old\par

## 2019-02-02 NOTE — ASSESSMENT
[Curative] : Goals of care discussed with patient: Curative [FreeTextEntry1] : Ms. Bermeo is a 49 y/o female with stage IA ([id number]) moderately differentiated ER+/WA+/HER2- invasive ductal carcinoma of the left breast s/p bilateral mastectomy, SLNB and ZANDER reconstruction 1/18/16, Oncotype 32, completed adjuvant chemotherapy with TCx4, on tamoxifen (8/1/16-9/2018), switched to anastrozole 9/2018, here for f/u. \par \par -  Breast ca-  She has Aromatase inhibitor induced arthralgia s/p AI break for 2 weeks. She noticed 70 % improvement in symptoms. We discussed that she will stop anastrazole and will be prescribed Exemestane. She will continue to hold off on AI medication for 2-3 weeks until pain completely gone. Concern that pain continues to persist despite stopping medication and will get Bone scan to rule out any metastatic disease. She will also f/u with orthopedics for shoulder pain. Referral sent.\par -Hot flashes improved, continue to remove layers and use fan as needed. Will consider Effexor if symptoms don't improve.\par - Concern for worsening bone density due to anastrozole. Rec to  continue calcium and vit D. DEXA 1-2 yrs. \par - GYN f/u due to h/o tamoxifen use.\par - No breast imaging needed\par - Continue to followup with primary care for HTN control\par \par PAtient seen and examined along with Dr. Kamryn Ellison.\par \par RTC 3m

## 2019-02-02 NOTE — REVIEW OF SYSTEMS
[Hot Flashes] : hot flashes [Negative] : Allergic/Immunologic [Recent Change In Weight] : ~T no recent weight change

## 2019-02-02 NOTE — REASON FOR VISIT
[Follow-Up Visit] : a follow-up [Other: _____] : [unfilled] [FreeTextEntry2] : breast cancer ER/ME Positive, HER-2 NEGATIVE

## 2019-02-02 NOTE — PHYSICAL EXAM
[Fully active, able to carry on all pre-disease performance without restriction] : Status 0 - Fully active, able to carry on all pre-disease performance without restriction [Normal] : affect appropriate [de-identified] : S/p bilateral mastectomy and ZANDER reconstruction. No masses or axillary adenopathy palpated bilaterally. . [de-identified] : Mild left arm swelling

## 2019-02-06 ENCOUNTER — FORM ENCOUNTER (OUTPATIENT)
Age: 49
End: 2019-02-06

## 2019-02-07 ENCOUNTER — OUTPATIENT (OUTPATIENT)
Dept: OUTPATIENT SERVICES | Facility: HOSPITAL | Age: 49
LOS: 1 days | End: 2019-02-07
Payer: COMMERCIAL

## 2019-02-07 ENCOUNTER — APPOINTMENT (OUTPATIENT)
Dept: NUCLEAR MEDICINE | Facility: IMAGING CENTER | Age: 49
End: 2019-02-07
Payer: COMMERCIAL

## 2019-02-07 DIAGNOSIS — C50.919 MALIGNANT NEOPLASM OF UNSPECIFIED SITE OF UNSPECIFIED FEMALE BREAST: ICD-10-CM

## 2019-02-07 DIAGNOSIS — Z90.13 ACQUIRED ABSENCE OF BILATERAL BREASTS AND NIPPLES: Chronic | ICD-10-CM

## 2019-02-07 PROCEDURE — 78306 BONE IMAGING WHOLE BODY: CPT | Mod: 26

## 2019-02-07 PROCEDURE — 78306 BONE IMAGING WHOLE BODY: CPT

## 2019-02-07 PROCEDURE — 78320: CPT | Mod: 26

## 2019-02-07 PROCEDURE — 78999 UNLISTED MISC PX DX NUC MED: CPT

## 2019-02-07 PROCEDURE — A9561: CPT

## 2019-02-11 ENCOUNTER — APPOINTMENT (OUTPATIENT)
Dept: ORTHOPEDIC SURGERY | Facility: CLINIC | Age: 49
End: 2019-02-11

## 2019-02-11 ENCOUNTER — APPOINTMENT (OUTPATIENT)
Dept: PLASTIC SURGERY | Facility: CLINIC | Age: 49
End: 2019-02-11
Payer: COMMERCIAL

## 2019-02-11 PROCEDURE — 99214 OFFICE O/P EST MOD 30 MIN: CPT

## 2019-02-11 NOTE — REASON FOR VISIT
[Follow-Up: _____] : a [unfilled] follow-up visit [FreeTextEntry1] : JIMMY: 07/01/2016 after Bilateral Masectomy w/ ZANDER on 01/18/16. Pt is doing well, no complaints.

## 2019-02-15 ENCOUNTER — APPOINTMENT (OUTPATIENT)
Dept: OBGYN | Facility: CLINIC | Age: 49
End: 2019-02-15
Payer: COMMERCIAL

## 2019-02-15 ENCOUNTER — LABORATORY RESULT (OUTPATIENT)
Age: 49
End: 2019-02-15

## 2019-02-15 VITALS
BODY MASS INDEX: 28.61 KG/M2 | HEART RATE: 96 BPM | HEIGHT: 66 IN | WEIGHT: 178 LBS | SYSTOLIC BLOOD PRESSURE: 133 MMHG | DIASTOLIC BLOOD PRESSURE: 87 MMHG

## 2019-02-15 DIAGNOSIS — N39.3 STRESS INCONTINENCE (FEMALE) (MALE): ICD-10-CM

## 2019-02-15 DIAGNOSIS — N95.2 POSTMENOPAUSAL ATROPHIC VAGINITIS: ICD-10-CM

## 2019-02-15 DIAGNOSIS — R33.9 RETENTION OF URINE, UNSPECIFIED: ICD-10-CM

## 2019-02-15 DIAGNOSIS — N39.490 STRESS INCONTINENCE (FEMALE) (MALE): ICD-10-CM

## 2019-02-15 PROCEDURE — 99213 OFFICE O/P EST LOW 20 MIN: CPT | Mod: 25

## 2019-02-15 PROCEDURE — 51798 US URINE CAPACITY MEASURE: CPT

## 2019-02-15 NOTE — PHYSICAL EXAM
[No Acute Distress] : in no acute distress [Well developed] : well developed [Well Nourished] : ~L well nourished [Good Hygeine] : demonstrates good hygeine [Oriented x3] : oriented to person, place, and time [Normal Memory] : ~T memory was ~L unimpaired [Normal Mood/Affect] : mood and affect are normal [Normal Lung Sounds] : the lungs were clear to auscultation [Respirations regular] : ~T respiratory rate was regular [Rate & Rhythm Regular] : ~T heart rate and rhythm were normal [No Edema] : ~T edema was not present [Supple] : ~T the neck demonstrated no ~M decrease in suppleness [Thyroid Normal] : the thyroid ~T showed no abnormalities [Symmetrical] : the neck was ~L symmetrical [Warm and Dry] : was warm and dry to touch [Turgor Normal] : skin turgor ~T was normal [Normal Gait] : gait was normal [No Joint Swelling] : there was no joint swelling [No Clubbing, Cyanosis] : no clubbing or cyanosis of the fingernails [Normal Strength/Tone] : muscle strength and tone were normal [Vulvar Atrophy] : vulvar atrophy [Labia Majora] : were normal [Labia Minora] : were normal [Bartholin's Gland] : both Bartholin's glands were normal  [Normal Appearance] : general appearance was normal [Atrophy] : atrophy [5] : 5 [Uterine Adnexae] : were not tender and not enlarged [Post Void Residual ____ml] : post void residual via catheterization was [unfilled] ml [Normal rectal exam] : was normal [Normal] : was normal [None] : no [Mass] : no breast mass [Tender] : no tenderness [Nipple Discharge] : no nipple discharge [Mass (___ Cm)] : no ~M [unfilled] abdominal mass was palpated [Tenderness] : ~T no ~M abdominal tenderness observed [H/Smegaly] : no hepatosplenomegaly [Indicated?] : was not indicated for this patient [Performed?] : was not performed [Supraclavicular LAD] : no adenopathy noted in supraclavicular lymph nodes [Axillary LAD] : no adenopathy was noted in axillary nodes [Inguinal LAD] : no adenopathy was noted in the inguinal lymph nodes [Rash/Lesion] : no rash or lesion was noted [Estrogen Effect] : no estrogen effect was observed

## 2019-02-15 NOTE — COUNSELING
[FreeTextEntry1] : Patient voids to completion as her PVR today was normal cc-however since her abdominal  flap surgery for reconstruction- bilateral mastectomy-She has noticed-that she does not get a normal urge to void-this may be d decreased  sensation-due to possible damage or change to the autonomic nervous system-if there was an interference-resulting in hyper sympathetic-and hypo-parasympathetic this would result in the above  symptom-the other possible  and explanation is constipation as she only empties her  bowel twice per week-her colonoscopy recently 2 years ago-and either this is a primary bowel issue or primary postop situation as interference with the  autonomic nervous system would eventuate in constipation and decreased sensation  of voiding-Since her pelvic sonogram is normal and renal sonogram is normal I am sending the patient to physical therapy at hospitals [facility] for electrical  stimulation of the bladder and pelvic floor-and rtc 6 mos or prn-doing better 2/15/19-\par -ua, uc, cytology neg last visit\par -sono abd/renal/pelvis to r/o mass effect or intrinsic lestion gu/gyn system or intra-abdominal mass effect-NORMAL-ordered for this spring to re ck ovaries (ta tv pelvic)\par -lit provided\par -computer modeling used to demo pt situation to her\par -will ask pt to void q2-3h and < constipation with fiber, water, spinach, prunes, metamucil, etc\par -pt referred will consider if bladder issue returns for now stable and min UI\par -uds if nec\par -ref to gu if nec (especially if any indication of intravesical neoplasia given her history of cancer/breast)\par -ref to pcp due to hypertension-pt will contact her pcp Dr. pedro ledesma due to somewhat elevated bp today-no symptoms of headache or cns issue today-medication dose increased 10 mg amliodopine\par -follow up appointment made for jan 2019  to discuss all of the above and develop a treatment plan\par -pt has had the opportunity to ask questions which have been answered to her apparent satisfaction\aristides Espinal

## 2019-02-15 NOTE — HISTORY OF PRESENT ILLNESS
[Cystocele (Obstetric)] : none [Uterine Prolapse] : none [Vaginal Wall Prolapse] : none [Rectal Prolapse] : none [Stress Incontinence] : none [Urge Incontinence Of Urine] : none [Unable To Restrain Bowel Movement] : none [x1] : once nightly [Urinary Stream Starts And Stops] : none [Incomplete Emptying Of Bladder] : none [Urinary Frequency] : none [Feelings Of Urinary Urgency] : rare [Pain During Urination (Dysuria)] : none [Urinary Tract Infection] : none [Hematuria] : none [Constipation Obstructed Defecation] : frequent [Incomplete Emptying Of Stool] : none [Stool Visible Blood] : none [Diarrhea] : none [Pelvic Pain] : none [Vaginal Pain] : none [Vulvar Pain] : none [Bladder Pain] : none [Rectal Pain] : none [Back Pain] : none [Sexual Dysfunction, NOS] : none [] : none [de-identified] : Infrequent void no leakage [de-identified] : If she squats [FreeTextEntry1] : Patient voids to completion as her PVR today was 45 cc-however since her abdominal  flap surgery for reconstruction- bilateral mastectomy-She has noticed-that she does not get a normal urge to void-this may be d decreased  sensation-due to possible damage or change to the autonomic nervous system-if there was an interference-resulting in hyper sympathetic-and hypo-parasympathetic this would result in the above  symptom-the other possible  and explanation is constipation as she only empties her  bowel twice per week-her colonoscopy recently 2 years ago-and either this is a primary bowel issue or primary postop situation as interference with the  autonomic nervous system would eventuate in constipation and decreased  sensation of voiding, as well as her infrequent voids and stooling.

## 2019-02-16 LAB
APPEARANCE: CLEAR
BACTERIA: NEGATIVE
BILIRUBIN URINE: NEGATIVE
BLOOD URINE: NEGATIVE
CALCIUM OXALATE CRYSTALS: ABNORMAL
COLOR: YELLOW
GLUCOSE QUALITATIVE U: NEGATIVE MG/DL
HYALINE CASTS: 0 /LPF
KETONES URINE: ABNORMAL
LEUKOCYTE ESTERASE URINE: NEGATIVE
MICROSCOPIC-UA: NORMAL
NITRITE URINE: NEGATIVE
PH URINE: 6
PROTEIN URINE: NEGATIVE MG/DL
RED BLOOD CELLS URINE: 4 /HPF
SPECIFIC GRAVITY URINE: 1.02
SQUAMOUS EPITHELIAL CELLS: 0 /HPF
UROBILINOGEN URINE: 1 MG/DL
WHITE BLOOD CELLS URINE: 1 /HPF

## 2019-02-17 LAB — BACTERIA UR CULT: NORMAL

## 2019-02-19 ENCOUNTER — OTHER (OUTPATIENT)
Age: 49
End: 2019-02-19

## 2019-03-04 ENCOUNTER — APPOINTMENT (OUTPATIENT)
Dept: ORTHOPEDIC SURGERY | Facility: CLINIC | Age: 49
End: 2019-03-04

## 2019-03-05 ENCOUNTER — APPOINTMENT (OUTPATIENT)
Dept: OBGYN | Facility: CLINIC | Age: 49
End: 2019-03-05

## 2019-03-21 ENCOUNTER — TRANSCRIPTION ENCOUNTER (OUTPATIENT)
Age: 49
End: 2019-03-21

## 2019-03-22 ENCOUNTER — APPOINTMENT (OUTPATIENT)
Dept: PLASTIC SURGERY | Facility: CLINIC | Age: 49
End: 2019-03-22

## 2019-04-03 ENCOUNTER — APPOINTMENT (OUTPATIENT)
Age: 49
End: 2019-04-03
Payer: COMMERCIAL

## 2019-04-03 PROCEDURE — 19350 NIPPLE/AREOLA RECONSTRUCTION: CPT | Mod: LT

## 2019-04-06 NOTE — REASON FOR VISIT
[Procedure: _________] : a [unfilled] procedure visit [Parent] : parent [FreeTextEntry1] : bilateral nipple/areola reconstruction and micropigmentation tattoo.

## 2019-04-06 NOTE — PROCEDURE
[FreeTextEntry1] : absence of bilateral breasts and nipples, s/p bilateral breast reconstruction [FreeTextEntry2] : bilateral nipple/areola reconstruction and micropigmentation tattoo [FreeTextEntry3] : topical anesthetic Duration and Magic  [FreeTextEntry4] : none [FreeTextEntry5] : none [FreeTextEntry6] : Bilateral breasts cleaned and prepped with Hibiclens. Topical anesthetic applied. Areola reconstruction performed according to the outlined area, surgically created Nikolai around the nipple. Nipples and areolas tattooed with multiple passes of colors 8 and 10 (1:2) using needle 5 slope utilizing Noveau Contour Nipple Tattoo machine. Bacitracin and Telfa applied. Good pigmentation and result after tattooing. Total time of procedure over 1 hour. Tolerated well. Post procedure instructions given. To apply bacitracin and Telfa daily.

## 2019-04-24 ENCOUNTER — OUTPATIENT (OUTPATIENT)
Dept: OUTPATIENT SERVICES | Facility: HOSPITAL | Age: 49
LOS: 1 days | Discharge: HOME | End: 2019-04-24

## 2019-04-24 DIAGNOSIS — C50.912 MALIGNANT NEOPLASM OF UNSPECIFIED SITE OF LEFT FEMALE BREAST: ICD-10-CM

## 2019-04-24 DIAGNOSIS — Z90.13 ACQUIRED ABSENCE OF BILATERAL BREASTS AND NIPPLES: Chronic | ICD-10-CM

## 2019-04-29 ENCOUNTER — APPOINTMENT (OUTPATIENT)
Dept: HEMATOLOGY ONCOLOGY | Facility: CLINIC | Age: 49
End: 2019-04-29
Payer: COMMERCIAL

## 2019-04-29 ENCOUNTER — APPOINTMENT (OUTPATIENT)
Dept: PLASTIC SURGERY | Facility: CLINIC | Age: 49
End: 2019-04-29
Payer: COMMERCIAL

## 2019-04-29 VITALS
SYSTOLIC BLOOD PRESSURE: 136 MMHG | BODY MASS INDEX: 28.72 KG/M2 | DIASTOLIC BLOOD PRESSURE: 66 MMHG | WEIGHT: 177.91 LBS | HEART RATE: 76 BPM | OXYGEN SATURATION: 99 % | RESPIRATION RATE: 16 BRPM | TEMPERATURE: 99.3 F

## 2019-04-29 DIAGNOSIS — I89.0 LYMPHEDEMA, NOT ELSEWHERE CLASSIFIED: ICD-10-CM

## 2019-04-29 DIAGNOSIS — Z79.811 LONG TERM (CURRENT) USE OF AROMATASE INHIBITORS: ICD-10-CM

## 2019-04-29 DIAGNOSIS — Z90.13 ACQUIRED ABSENCE OF BILATERAL BREASTS AND NIPPLES: ICD-10-CM

## 2019-04-29 PROCEDURE — 99214 OFFICE O/P EST MOD 30 MIN: CPT

## 2019-04-29 PROCEDURE — 99024 POSTOP FOLLOW-UP VISIT: CPT

## 2019-04-29 RX ORDER — ANASTROZOLE TABLETS 1 MG/1
1 TABLET ORAL DAILY
Qty: 90 | Refills: 1 | Status: DISCONTINUED | COMMUNITY
Start: 2018-09-28 | End: 2019-04-29

## 2019-04-29 NOTE — REASON FOR VISIT
[Follow-Up: _____] : a [unfilled] follow-up visit [Family Member] : family member [FreeTextEntry1] : JIMMY: 07/01/2016 after Bilateral Mastectomy w/ ZANDER on 01/18/16, s/p bilateral nipple/areola tattoo

## 2019-04-30 NOTE — HISTORY OF PRESENT ILLNESS
[Disease: _____________________] : Disease: [unfilled] [T: ___] : T[unfilled] [N: ___] : N[unfilled] [M: ___] : M[unfilled] [AJCC Stage: ____] : AJCC Stage: [unfilled] [IA] : IA [de-identified] : Ms. Bermeo is a 49 y/o female with stage IA invasive ductal carcinoma of left breast, ER+/IA+/HER2- s/p bilateral mastectomy and adjuvant chemotherapy with TC x4 here for f/u. Her oncologic history is as follows: \par \par She underwent a screening mammography on 10/16/2015 because of a lump she had palpated in the left breast, which revealed two clusters of heterogeneous microcalcifications in the left breast.  This was followed by an ultrasound  which revealed a 0.9 x 1.3 x 0.6cm poorly marginated/microlobulated, heterogeneously hypoechoic nodule in the left breast, directly beneath the region of interest of the palpable lump, 9:00 position 1 cm from the nipple. She underwent a core biopsy of the nodule on 11/4/2015 which revealed DCIS, solid and cribriform patterns, high grade nuclear atypia and focal necrosis. ER 60%, IA 30%. This biopsy was followed by stereotactic core biopsies\par of the two groupings of calcifications in the left breast on 11/6/2015. Biopsy of ''left 9:00 anterior" revealed  DCIS and biopsy of "left 9:00 posterior" revealed grade 2 IDC along with DCIS. ER 95%, IA 95%, HER2 0/1+. ER/IA repeated on DCIS (but unclear if on anterior or posterior block): ER 95%, IA 95%.\par \par Following the biopsies, she had a bilateral breast MRI on 11/19/15.  The left breast revealed a large ~5cm area of nonmass enhancement extending from the upper inner breast, involving the palpable area. Additionally, there was a 5.3cm rim enhancing mass at the superior posterior central left breast likely a combination of post biopsy and hematoma and residual disease. 4 mm focus of enhancement lower outer left breast. The right breast demonstrated a 11 mm heterogeneous focal nonmass enhancement central right breast. \par Bilateral prominent axillary lymph nodes. She underwent biopsies of both axillary LNs on 12/9/15: right LN: fragmented, unremarkable LN. Left LN: unremarkable breast tissue, no lymphoid tissue identified. She also had a MRI guided core biopsy of the right breast on 12/11/15: benign.  \par \par 1/18/16- Bilateral mastectomy (patient preference; genetics neg) and SLNB with ZANDER reconstruction-  \par Right mastectomy: proliferative fibrocystic changes with usual ductal hyperplasia, calcification, biopsy site changes, columnar cell change, sclerosing adenosis and stromal fibrosis, 0/3 nodes\par Left mastectomy: Invasive ductal carcinoma, stage IA ([id number])\par Size: 3 foci, largest 9 mm (others 4mm, 5mm)   stGstrstastdstest:st st1st LNs: Negative (0/3 sentinel LNs, 0/2 internal mammary LNs)  DCIS: Present  Margins: Negative  LVI: absent \par ER 80%, IA 90%, HER2 negative \par Oncotype: 32\par  \par Adjuvant chemotherapy: TC x4 (3/11/16-5/13/16)\par Tamoxifen (8/1/16-)\par \par Her medical history is notable for iron deficiency anemia for which she received IV iron in November 2015.  She had a CT c/a/p, EGD and colonoscopy, all of which were reportedly normal. She states she has had iron deficiency in the past but never to this degree. No clear etiology of iron deficiency so far. 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 Sherice Ellison for evaluation of hypercoagulable state prior to surgery. Hypercoagulable w/u negative but given her history, prophylactic lovenox was recommended for 4 weeks postop.  [de-identified] : Moderately differentiated invasive ductal carcinoma [de-identified] : ER 80%, [place] 90%, HER2 negative  [de-identified] : Genetics: CancerNext 32 Gene Panel from [facility]: Negative  [FreeTextEntry1] : Tamoxifen 8/2016- 9/2018; Anastrazole since 9/2018- 1/2019 exemestane 1/2019.  [de-identified] : Ms. BRE GORE is here for a follow up of left breast cancer on endocrine therapy since 8/2016.\par  She was having increased left shoulder pain and achiness with associated numbness of all 5 fingers- all resolved after anastrozole break. No hot flashes. She stopped OTC pain meds. Didn't need to see ortho. She was switched to Exemestane in 1/2019. She is doing much better with exemestane. She is very active, no change in energy, appetite or wt. Takes care of her family is very busy. Has a 6 yr old, 12 yrs, 24 and 30 yrs old\par Bone scan  2/2019: negative  for mets, degenerative changes. \par LMP date: 2 yrs ago , no spotting, no vaginal bleeding. GYN annually. \par Dexa scan 4/2017: normal cont ca and vit d Due 4/2019\par no need for breast imaging

## 2019-04-30 NOTE — ASSESSMENT
[Curative] : Goals of care discussed with patient: Curative [FreeTextEntry1] : Ms. Bermeo is a 49 y/o female with stage IA ([id number]) moderately differentiated ER+/UT+/HER2- invasive ductal carcinoma of the left breast s/p bilateral mastectomy, SLNB and ZANDER reconstruction 1/18/16, Oncotype 32, completed adjuvant chemotherapy with TCx4, on tamoxifen (8/1/16-9/2018), switched to anastrozole 9/2018, exemestane 1/2019. here for f/u. \par \par -  Breast ca-  She has Aromatase inhibitor induced arthralgia with anastrozole, improved with AI break. She started exemestane in 3/2019 and is doing much better. Plan to continue endocrine therapy for 5-10 yrs. No breast imaging needed. \par - Mild hot flashes: 2/2 exemestane.  Advised to wear layers and use fan prn. Consider Effexor if get worse.\par - Concern for worsening bone density due to anastrozole. Rec to  continue calcium and vit D. DEXA due now.  \par - GYN f/u due to h/o tamoxifen use.\par - Continue to followup with primary care for HTN control\par RTC 6m

## 2019-04-30 NOTE — REVIEW OF SYSTEMS
[Hot Flashes] : hot flashes [Negative] : Allergic/Immunologic [Fever] : no fever [Recent Change In Weight] : ~T no recent weight change

## 2019-04-30 NOTE — PHYSICAL EXAM
[Fully active, able to carry on all pre-disease performance without restriction] : Status 0 - Fully active, able to carry on all pre-disease performance without restriction [Normal] : full range of motion and no deformities appreciated [de-identified] : S/p bilateral mastectomy and ZANDER reconstruction. No masses or axillary adenopathy palpated bilaterally.

## 2019-05-02 ENCOUNTER — FORM ENCOUNTER (OUTPATIENT)
Age: 49
End: 2019-05-02

## 2019-05-03 ENCOUNTER — OUTPATIENT (OUTPATIENT)
Dept: OUTPATIENT SERVICES | Facility: HOSPITAL | Age: 49
LOS: 1 days | End: 2019-05-03
Payer: COMMERCIAL

## 2019-05-03 ENCOUNTER — APPOINTMENT (OUTPATIENT)
Dept: RADIOLOGY | Facility: IMAGING CENTER | Age: 49
End: 2019-05-03
Payer: COMMERCIAL

## 2019-05-03 DIAGNOSIS — C50.919 MALIGNANT NEOPLASM OF UNSPECIFIED SITE OF UNSPECIFIED FEMALE BREAST: ICD-10-CM

## 2019-05-03 DIAGNOSIS — Z90.13 ACQUIRED ABSENCE OF BILATERAL BREASTS AND NIPPLES: Chronic | ICD-10-CM

## 2019-05-03 PROCEDURE — 77080 DXA BONE DENSITY AXIAL: CPT | Mod: 26

## 2019-05-03 PROCEDURE — 77080 DXA BONE DENSITY AXIAL: CPT

## 2019-08-15 ENCOUNTER — OUTPATIENT (OUTPATIENT)
Dept: OUTPATIENT SERVICES | Facility: HOSPITAL | Age: 49
LOS: 1 days | Discharge: HOME | End: 2019-08-15

## 2019-08-15 DIAGNOSIS — Z90.13 ACQUIRED ABSENCE OF BILATERAL BREASTS AND NIPPLES: Chronic | ICD-10-CM

## 2019-08-15 DIAGNOSIS — C50.912 MALIGNANT NEOPLASM OF UNSPECIFIED SITE OF LEFT FEMALE BREAST: ICD-10-CM

## 2019-08-16 ENCOUNTER — APPOINTMENT (OUTPATIENT)
Dept: HEMATOLOGY ONCOLOGY | Facility: CLINIC | Age: 49
End: 2019-08-16
Payer: COMMERCIAL

## 2019-08-16 VITALS
RESPIRATION RATE: 16 BRPM | HEART RATE: 72 BPM | SYSTOLIC BLOOD PRESSURE: 145 MMHG | OXYGEN SATURATION: 100 % | DIASTOLIC BLOOD PRESSURE: 99 MMHG | WEIGHT: 177.91 LBS | TEMPERATURE: 98.3 F | BODY MASS INDEX: 28.72 KG/M2

## 2019-08-16 PROCEDURE — 99215 OFFICE O/P EST HI 40 MIN: CPT

## 2019-08-18 RX ORDER — EXEMESTANE 25 MG/1
25 TABLET, FILM COATED ORAL
Qty: 90 | Refills: 1 | Status: DISCONTINUED | COMMUNITY
Start: 2019-01-28 | End: 2019-08-18

## 2019-08-18 NOTE — REASON FOR VISIT
[Follow-Up Visit] : a follow-up [Parent] : parent [FreeTextEntry2] : breast cancer ER/[place] Positive, HER-2 NEGATIVE

## 2019-08-18 NOTE — HISTORY OF PRESENT ILLNESS
[Disease: _____________________] : Disease: [unfilled] [T: ___] : T[unfilled] [N: ___] : N[unfilled] [M: ___] : M[unfilled] [AJCC Stage: ____] : AJCC Stage: [unfilled] [IA] : IA [de-identified] : Ms. Bermeo is a 49 y/o female with stage IA invasive ductal carcinoma of left breast, ER+/NE+/HER2- s/p bilateral mastectomy and adjuvant chemotherapy with TC x4 here for f/u. Her oncologic history is as follows: \par \par She underwent a screening mammography on 10/16/2015 because of a lump she had palpated in the left breast, which revealed two clusters of heterogeneous microcalcifications in the left breast.  This was followed by an ultrasound  which revealed a 0.9 x 1.3 x 0.6cm poorly marginated/microlobulated, heterogeneously hypoechoic nodule in the left breast, directly beneath the region of interest of the palpable lump, 9:00 position 1 cm from the nipple. She underwent a core biopsy of the nodule on 11/4/2015 which revealed DCIS, solid and cribriform patterns, high grade nuclear atypia and focal necrosis. ER 60%, NE 30%. This biopsy was followed by stereotactic core biopsies\par of the two groupings of calcifications in the left breast on 11/6/2015. Biopsy of ''left 9:00 anterior" revealed  DCIS and biopsy of "left 9:00 posterior" revealed grade 2 IDC along with DCIS. ER 95%, NE 95%, HER2 0/1+. ER/NE repeated on DCIS (but unclear if on anterior or posterior block): ER 95%, NE 95%.\par \par Following the biopsies, she had a bilateral breast MRI on 11/19/15.  The left breast revealed a large ~5cm area of nonmass enhancement extending from the upper inner breast, involving the palpable area. Additionally, there was a 5.3cm rim enhancing mass at the superior posterior central left breast likely a combination of post biopsy and hematoma and residual disease. 4 mm focus of enhancement lower outer left breast. The right breast demonstrated a 11 mm heterogeneous focal nonmass enhancement central right breast. \par Bilateral prominent axillary lymph nodes. She underwent biopsies of both axillary LNs on 12/9/15: right LN: fragmented, unremarkable LN. Left LN: unremarkable breast tissue, no lymphoid tissue identified. She also had a MRI guided core biopsy of the right breast on 12/11/15: benign.  \par \par 1/18/16- Bilateral mastectomy (patient preference; genetics neg) and SLNB with ZANDER reconstruction-  \par Right mastectomy: proliferative fibrocystic changes with usual ductal hyperplasia, calcification, biopsy site changes, columnar cell change, sclerosing adenosis and stromal fibrosis, 0/3 nodes\par Left mastectomy: Invasive ductal carcinoma, stage IA ([id number])\par Size: 3 foci, largest 9 mm (others 4mm, 5mm)   stGstrstastdstest:st st1st LNs: Negative (0/3 sentinel LNs, 0/2 internal mammary LNs)  DCIS: Present  Margins: Negative  LVI: absent \par ER 80%, NE 90%, HER2 negative \par Oncotype: 32\par  \par Adjuvant chemotherapy: TC x4 (3/11/16-5/13/16)\par Tamoxifen (8/1/16-)\par \par Her medical history is notable for iron deficiency anemia for which she received IV iron in November 2015.  She had a CT c/a/p, EGD and colonoscopy, all of which were reportedly normal. She states she has had iron deficiency in the past but never to this degree. No clear etiology of iron deficiency so far. 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 Sherice Ellison for evaluation of hypercoagulable state prior to surgery. Hypercoagulable w/u negative but given her history, prophylactic lovenox was recommended for 4 weeks postop.  [de-identified] : Moderately differentiated invasive ductal carcinoma [de-identified] : ER 80%, [place] 90%, HER2 negative  [de-identified] : Genetics: CancerNext 32 Gene Panel from [facility]: Negative  [FreeTextEntry1] : Tamoxifen 8/2016- 9/2018; Anastrazole since 9/2018- 1/2019 exemestane 1/2019.  [de-identified] : Ms. BRE GORE is here for a follow up of left breast cancer on endocrine therapy since 8/2016.\par  She was having increased left shoulder pain and achiness with associated numbness of all 5 fingers- all resolved after anastrozole break. No hot flashes. She stopped OTC pain meds. Didn't need to see ortho. She was switched to Exemestane in 1/2019. She c/o arthralgias of multiple joints in 7/2019. Now s/p 3 week AI holiday. Sx are 90% better with AI break\par Bone scan  2/2019: negative  for mets\par LMP date: 2 yrs ago , no spotting, no vaginal bleeding. GYN annually. \par Dexa scan 5/2019: normal cont ca and vit d \par no need for breast imaging

## 2019-08-18 NOTE — ASSESSMENT
[Curative] : Goals of care discussed with patient: Curative [FreeTextEntry1] : Ms. Bermeo is a 48 y/o female with stage IA ([id number]) moderately differentiated ER+/[place]+/HER2- invasive ductal carcinoma of the left breast s/p bilateral mastectomy, SLNB and ZANDER reconstruction 1/18/16, Oncotype 32, completed adjuvant chemotherapy with TCx4, on tamoxifen (8/1/16-9/2018), switched to anastrozole 9/2018, exemestane 1/2019. here for f/u. \par \par -  Breast ca-  She has Aromatase inhibitor induced arthralgia with anastrozole, improved with AI break. She started exemestane in 3/2019 and developed arthralgias of multiple joints in 7/2019. S/p 3 weeks break. Feels much better. Doesnt want to try letrozole. We will restart tamoxifen. Tamoxifen s/e reviewed. Risk of uterine ca, DVT, CVA and cataracts reviewed. She will see GYN and opthal annually for s/e monitoring. She has HTN and was rec to take ASA for VTE/CVA prophylaxis. Plan to continue endocrine therapy for 10 yrs. No breast imaging needed. \par - Mild hot flashes: 2/2 breast ca treatment.  Advised to wear layers and use fan prn. Consider Effexor if get worse.\par - Concern for worsening bone density due to anastrozole. Rec to  continue calcium and vit D. DEXA due now.  \par - GYN f/u due to h/o tamoxifen use.\par - Continue to followup with primary care for HTN control\par RTC 6m

## 2019-08-18 NOTE — PHYSICAL EXAM
[Fully active, able to carry on all pre-disease performance without restriction] : Status 0 - Fully active, able to carry on all pre-disease performance without restriction [Normal] : affect appropriate [de-identified] : S/p bilateral mastectomy and ZANDER reconstruction. No masses or axillary adenopathy palpated bilaterally

## 2019-10-18 ENCOUNTER — OUTPATIENT (OUTPATIENT)
Dept: OUTPATIENT SERVICES | Facility: HOSPITAL | Age: 49
LOS: 1 days | Discharge: HOME | End: 2019-10-18

## 2019-10-18 DIAGNOSIS — Z90.13 ACQUIRED ABSENCE OF BILATERAL BREASTS AND NIPPLES: Chronic | ICD-10-CM

## 2019-10-18 DIAGNOSIS — C50.912 MALIGNANT NEOPLASM OF UNSPECIFIED SITE OF LEFT FEMALE BREAST: ICD-10-CM

## 2019-10-28 ENCOUNTER — APPOINTMENT (OUTPATIENT)
Dept: HEMATOLOGY ONCOLOGY | Facility: CLINIC | Age: 49
End: 2019-10-28
Payer: COMMERCIAL

## 2019-10-28 VITALS
WEIGHT: 176.37 LBS | RESPIRATION RATE: 18 BRPM | TEMPERATURE: 98.4 F | OXYGEN SATURATION: 100 % | SYSTOLIC BLOOD PRESSURE: 135 MMHG | HEART RATE: 72 BPM | DIASTOLIC BLOOD PRESSURE: 91 MMHG | BODY MASS INDEX: 28.47 KG/M2

## 2019-10-28 PROCEDURE — 99214 OFFICE O/P EST MOD 30 MIN: CPT

## 2019-11-02 NOTE — PHYSICAL EXAM
[Fully active, able to carry on all pre-disease performance without restriction] : Status 0 - Fully active, able to carry on all pre-disease performance without restriction [Normal] : affect appropriate [de-identified] : S/p bilateral mastectomy and ZANDER reconstruction. No masses or axillary adenopathy palpated bilaterally

## 2019-11-02 NOTE — HISTORY OF PRESENT ILLNESS
[Disease: _____________________] : Disease: [unfilled] [T: ___] : T[unfilled] [N: ___] : N[unfilled] [M: ___] : M[unfilled] [AJCC Stage: ____] : AJCC Stage: [unfilled] [IA] : IA [de-identified] : Ms. Bermeo is a 49 y/o female with stage IA invasive ductal carcinoma of left breast, ER+/AR+/HER2- s/p bilateral mastectomy and adjuvant chemotherapy with TC x4 here for f/u. Her oncologic history is as follows: \par \par She underwent a screening mammography on 10/16/2015 because of a lump she had palpated in the left breast, which revealed two clusters of heterogeneous microcalcifications in the left breast.  This was followed by an ultrasound  which revealed a 0.9 x 1.3 x 0.6cm poorly marginated/microlobulated, heterogeneously hypoechoic nodule in the left breast, directly beneath the region of interest of the palpable lump, 9:00 position 1 cm from the nipple. She underwent a core biopsy of the nodule on 11/4/2015 which revealed DCIS, solid and cribriform patterns, high grade nuclear atypia and focal necrosis. ER 60%, AR 30%. This biopsy was followed by stereotactic core biopsies\par of the two groupings of calcifications in the left breast on 11/6/2015. Biopsy of ''left 9:00 anterior" revealed  DCIS and biopsy of "left 9:00 posterior" revealed grade 2 IDC along with DCIS. ER 95%, AR 95%, HER2 0/1+. ER/AR repeated on DCIS (but unclear if on anterior or posterior block): ER 95%, AR 95%.\par \par Following the biopsies, she had a bilateral breast MRI on 11/19/15.  The left breast revealed a large ~5cm area of nonmass enhancement extending from the upper inner breast, involving the palpable area. Additionally, there was a 5.3cm rim enhancing mass at the superior posterior central left breast likely a combination of post biopsy and hematoma and residual disease. 4 mm focus of enhancement lower outer left breast. The right breast demonstrated a 11 mm heterogeneous focal nonmass enhancement central right breast. \par Bilateral prominent axillary lymph nodes. She underwent biopsies of both axillary LNs on 12/9/15: right LN: fragmented, unremarkable LN. Left LN: unremarkable breast tissue, no lymphoid tissue identified. She also had a MRI guided core biopsy of the right breast on 12/11/15: benign.  \par \par 1/18/16- Bilateral mastectomy (patient preference; genetics neg) and SLNB with ZANDER reconstruction-  \par Right mastectomy: proliferative fibrocystic changes with usual ductal hyperplasia, calcification, biopsy site changes, columnar cell change, sclerosing adenosis and stromal fibrosis, 0/3 nodes\par Left mastectomy: Invasive ductal carcinoma, stage IA ([id number])\par Size: 3 foci, largest 9 mm (others 4mm, 5mm)   stGstrstastdstest:st st1st LNs: Negative (0/3 sentinel LNs, 0/2 internal mammary LNs)  DCIS: Present  Margins: Negative  LVI: absent \par ER 80%, AR 90%, HER2 negative \par Oncotype: 32\par  \par Adjuvant chemotherapy: TC x4 (3/11/16-5/13/16)\par Tamoxifen (8/1/16-)\par \par Her medical history is notable for iron deficiency anemia for which she received IV iron in November 2015.  She had a CT c/a/p, EGD and colonoscopy, all of which were reportedly normal. She states she has had iron deficiency in the past but never to this degree. No clear etiology of iron deficiency so far. 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 Sherice Ellison for evaluation of hypercoagulable state prior to surgery. Hypercoagulable w/u negative but given her history, prophylactic lovenox was recommended for 4 weeks postop.  [de-identified] : Moderately differentiated invasive ductal carcinoma [de-identified] : ER 80%, [place] 90%, HER2 negative  [de-identified] : Genetics: CancerNext 32 Gene Panel from [facility]: Negative  [FreeTextEntry1] : Tamoxifen 8/2016- 9/2018; Anastrazole since 9/2018- 1/2019 exemestane 1/2019.  [de-identified] : Ms. BRE GORE is here for a follow up of left breast cancer on endocrine therapy since 8/2016. She tried AI x 2 after menopause but had to stop due to intolerable s/e. \par She is tolerating tamoxifen well. Good compliance. She denies mood changes, wt gain, vaginal dryness, SOB, chest pain, leg swelling or clotting issues. Her energy and appetite is good, wt stable. Working full time.\par She is postmenopausal. No PMB. GYN - Dr Hansen , due to see Dec\par Breast imaging: not needed\par Opthal: She has glasses, saw earlier this year\par Bone scan  2/2019: negative  for mets \par Dexa scan 5/2019: normal cont ca and vit d

## 2019-11-02 NOTE — ASSESSMENT
[Curative] : Goals of care discussed with patient: Curative [FreeTextEntry1] : Ms. Bermeo is a 50 y/o female with stage IA ([id number]) moderately differentiated ER+/[place]+/HER2- invasive ductal carcinoma of the left breast s/p bilateral mastectomy, SLNB and ZANDER reconstruction 1/18/16, Oncotype 32, completed adjuvant chemotherapy with TCx4, on tamoxifen (8/1/16-9/2018), switched to anastrozole 9/2018, exemestane 1/2019. Suffered arthralgias 2/2 AI. Tamoxifen restarted 8/2019\par \par - Breast ca: She is tolerating tamoxifen very well without significant side effects. Reports good compliance. Plan to continue tamoxifen for 10 years (8/2026). GYN and opthal f/u annually due to risk of endometrial ca and cataracts respectively.  No breast imaging needed\par -  Mild hot flashes: 2/2 tamoxifen.  Advised to wear layers and use fan prn. Consider Effexor if get worse.\par - Concern for Wt gain 2/2 tamoxifen:Life style modifications, healthy diet and exercise d/w her\par - Patient is aware of signs and symptoms of DVT/PE. Patient will report if she develops acute onset chest pain shortness of breath, leg swelling or calf pain. \par - Continue to followup with primary care for HTN control. ASA 81 advised\par RTC 6m

## 2020-01-25 ENCOUNTER — OUTPATIENT (OUTPATIENT)
Dept: OUTPATIENT SERVICES | Facility: HOSPITAL | Age: 50
LOS: 1 days | Discharge: HOME | End: 2020-01-25

## 2020-01-25 DIAGNOSIS — Z90.13 ACQUIRED ABSENCE OF BILATERAL BREASTS AND NIPPLES: Chronic | ICD-10-CM

## 2020-01-25 DIAGNOSIS — C50.912 MALIGNANT NEOPLASM OF UNSPECIFIED SITE OF LEFT FEMALE BREAST: ICD-10-CM

## 2020-02-03 ENCOUNTER — RESULT REVIEW (OUTPATIENT)
Age: 50
End: 2020-02-03

## 2020-02-03 ENCOUNTER — APPOINTMENT (OUTPATIENT)
Dept: HEMATOLOGY ONCOLOGY | Facility: CLINIC | Age: 50
End: 2020-02-03
Payer: COMMERCIAL

## 2020-02-03 VITALS
DIASTOLIC BLOOD PRESSURE: 93 MMHG | BODY MASS INDEX: 28.04 KG/M2 | TEMPERATURE: 99.2 F | SYSTOLIC BLOOD PRESSURE: 150 MMHG | RESPIRATION RATE: 16 BRPM | HEART RATE: 84 BPM | WEIGHT: 173.72 LBS | OXYGEN SATURATION: 99 %

## 2020-02-03 DIAGNOSIS — E55.9 VITAMIN D DEFICIENCY, UNSPECIFIED: ICD-10-CM

## 2020-02-03 LAB
BASOPHILS # BLD AUTO: 0 K/UL — SIGNIFICANT CHANGE UP (ref 0–0.2)
BASOPHILS NFR BLD AUTO: 0 % — SIGNIFICANT CHANGE UP (ref 0–2)
EOSINOPHIL # BLD AUTO: 0.1 K/UL — SIGNIFICANT CHANGE UP (ref 0–0.5)
EOSINOPHIL NFR BLD AUTO: 1.9 % — SIGNIFICANT CHANGE UP (ref 0–6)
HCT VFR BLD CALC: 35.3 % — SIGNIFICANT CHANGE UP (ref 34.5–45)
HGB BLD-MCNC: 12.1 G/DL — SIGNIFICANT CHANGE UP (ref 11.5–15.5)
LYMPHOCYTES # BLD AUTO: 1.4 K/UL — SIGNIFICANT CHANGE UP (ref 1–3.3)
LYMPHOCYTES # BLD AUTO: 31.2 % — SIGNIFICANT CHANGE UP (ref 13–44)
MCHC RBC-ENTMCNC: 26.4 PG — LOW (ref 27–34)
MCHC RBC-ENTMCNC: 34.1 G/DL — SIGNIFICANT CHANGE UP (ref 32–36)
MCV RBC AUTO: 77.5 FL — LOW (ref 80–100)
MONOCYTES # BLD AUTO: 0.4 K/UL — SIGNIFICANT CHANGE UP (ref 0–0.9)
MONOCYTES NFR BLD AUTO: 9.1 % — SIGNIFICANT CHANGE UP (ref 2–14)
NEUTROPHILS # BLD AUTO: 2.5 K/UL — SIGNIFICANT CHANGE UP (ref 1.8–7.4)
NEUTROPHILS NFR BLD AUTO: 57.9 % — SIGNIFICANT CHANGE UP (ref 43–77)
PLATELET # BLD AUTO: 198 K/UL — SIGNIFICANT CHANGE UP (ref 150–400)
RBC # BLD: 4.56 M/UL — SIGNIFICANT CHANGE UP (ref 3.8–5.2)
RBC # FLD: 12.1 % — SIGNIFICANT CHANGE UP (ref 10.3–14.5)
WBC # BLD: 4.3 K/UL — SIGNIFICANT CHANGE UP (ref 3.8–10.5)
WBC # FLD AUTO: 4.3 K/UL — SIGNIFICANT CHANGE UP (ref 3.8–10.5)

## 2020-02-03 PROCEDURE — 99214 OFFICE O/P EST MOD 30 MIN: CPT

## 2020-02-06 PROBLEM — E55.9 VITAMIN D INSUFFICIENCY: Status: ACTIVE | Noted: 2020-02-06

## 2020-02-06 LAB
25(OH)D3 SERPL-MCNC: 21.6 NG/ML
ALBUMIN SERPL ELPH-MCNC: 4.5 G/DL
ALP BLD-CCNC: 54 U/L
ALT SERPL-CCNC: 17 U/L
ANION GAP SERPL CALC-SCNC: 11 MMOL/L
AST SERPL-CCNC: 20 U/L
BILIRUB SERPL-MCNC: 0.3 MG/DL
BUN SERPL-MCNC: 11 MG/DL
CALCIUM SERPL-MCNC: 9.5 MG/DL
CHLORIDE SERPL-SCNC: 108 MMOL/L
CO2 SERPL-SCNC: 24 MMOL/L
CREAT SERPL-MCNC: 0.66 MG/DL
GLUCOSE SERPL-MCNC: 97 MG/DL
POTASSIUM SERPL-SCNC: 4.1 MMOL/L
PROT SERPL-MCNC: 7.7 G/DL
SODIUM SERPL-SCNC: 144 MMOL/L

## 2020-02-06 NOTE — REASON FOR VISIT
[Follow-Up Visit] : a follow-up [Other: _____] : [unfilled] [FreeTextEntry2] : breast cancer ER/[place] Positive, HER-2 NEGATIVE

## 2020-02-06 NOTE — HISTORY OF PRESENT ILLNESS
[Disease: _____________________] : Disease: [unfilled] [T: ___] : T[unfilled] [N: ___] : N[unfilled] [M: ___] : M[unfilled] [AJCC Stage: ____] : AJCC Stage: [unfilled] [IA] : IA [de-identified] : Ms. Bermeo is a 49 y/o female with stage IA invasive ductal carcinoma of left breast, ER+/RI+/HER2- s/p bilateral mastectomy and adjuvant chemotherapy with TC x4 here for f/u. Her oncologic history is as follows: \par \par She underwent a screening mammography on 10/16/2015 because of a lump she had palpated in the left breast, which revealed two clusters of heterogeneous microcalcifications in the left breast.  This was followed by an ultrasound  which revealed a 0.9 x 1.3 x 0.6cm poorly marginated/microlobulated, heterogeneously hypoechoic nodule in the left breast, directly beneath the region of interest of the palpable lump, 9:00 position 1 cm from the nipple. She underwent a core biopsy of the nodule on 11/4/2015 which revealed DCIS, solid and cribriform patterns, high grade nuclear atypia and focal necrosis. ER 60%, RI 30%. This biopsy was followed by stereotactic core biopsies\par of the two groupings of calcifications in the left breast on 11/6/2015. Biopsy of ''left 9:00 anterior" revealed  DCIS and biopsy of "left 9:00 posterior" revealed grade 2 IDC along with DCIS. ER 95%, RI 95%, HER2 0/1+. ER/RI repeated on DCIS (but unclear if on anterior or posterior block): ER 95%, RI 95%.\par \par Following the biopsies, she had a bilateral breast MRI on 11/19/15.  The left breast revealed a large ~5cm area of nonmass enhancement extending from the upper inner breast, involving the palpable area. Additionally, there was a 5.3cm rim enhancing mass at the superior posterior central left breast likely a combination of post biopsy and hematoma and residual disease. 4 mm focus of enhancement lower outer left breast. The right breast demonstrated a 11 mm heterogeneous focal nonmass enhancement central right breast. \par Bilateral prominent axillary lymph nodes. She underwent biopsies of both axillary LNs on 12/9/15: right LN: fragmented, unremarkable LN. Left LN: unremarkable breast tissue, no lymphoid tissue identified. She also had a MRI guided core biopsy of the right breast on 12/11/15: benign.  \par \par 1/18/16- Bilateral mastectomy (patient preference; genetics neg) and SLNB with ZANDER reconstruction-  \par Right mastectomy: proliferative fibrocystic changes with usual ductal hyperplasia, calcification, biopsy site changes, columnar cell change, sclerosing adenosis and stromal fibrosis, 0/3 nodes\par Left mastectomy: Invasive ductal carcinoma, stage IA ([id number])\par Size: 3 foci, largest 9 mm (others 4mm, 5mm)   stGstrstastdstest:st st1st LNs: Negative (0/3 sentinel LNs, 0/2 internal mammary LNs)  DCIS: Present  Margins: Negative  LVI: absent \par ER 80%, [place] 90%, HER2 negative \par Oncotype: 32\par  \par Adjuvant chemotherapy: TC x4 (3/11/16-5/13/16)\par Tamoxifen (8/1/16-)\par \par Her medical history is notable for iron deficiency anemia for which she received IV iron in November 2015.  She had a CT c/a/p, EGD and colonoscopy, all of which were reportedly normal. She states she has had iron deficiency in the past but never to this degree. No clear etiology of iron deficiency so far. 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 Sherice Ellison for evaluation of hypercoagulable state prior to surgery. Hypercoagulable w/u negative but given her history, prophylactic lovenox was recommended for 4 weeks postop.  [de-identified] : ER 80%, [place] 90%, HER2 negative  [de-identified] : Genetics: CancerNext 32 Gene Panel from [facility]: Negative  [de-identified] : Moderately differentiated invasive ductal carcinoma [de-identified] : Ms. BRE GORE is here for a follow up of left breast cancer on endocrine therapy since 8/2016. She tried AI x 2 after menopause but had to stop due to intolerable s/e. \par She is tolerating tamoxifen well. Good compliance. She denies mood changes, wt gain, vaginal dryness, SOB, chest pain, leg swelling or clotting issues. Her energy and appetite is good, wt stable. Working full time. Mild hot flashes are manageable\par She is postmenopausal. No PMB. GYN - Dr Hansen. Saw 2/2019\par Breast imaging: not needed\par Opthal: She has glasses, saw earlier this year\par Bone scan  2/2019: negative  for mets \par Dexa scan 5/2019: normal cont ca and vit d  [FreeTextEntry1] : Tamoxifen 8/2016- 9/2018; Anastrazole since 9/2018- 1/2019 exemestane 1/2019.

## 2020-02-06 NOTE — PHYSICAL EXAM
[Fully active, able to carry on all pre-disease performance without restriction] : Status 0 - Fully active, able to carry on all pre-disease performance without restriction [Normal] : affect appropriate [de-identified] : S/p bilateral mastectomy and ZANDER reconstruction. No masses or axillary adenopathy palpated bilaterally

## 2020-02-06 NOTE — ASSESSMENT
[Curative] : Goals of care discussed with patient: Curative [FreeTextEntry1] : Ms. Bermeo is a 48 y/o female with stage IA ([id number]) moderately differentiated ER+/VA+/HER2- invasive ductal carcinoma of the left breast s/p bilateral mastectomy, SLNB and ZANDER reconstruction 1/18/16, Oncotype 32, completed adjuvant chemotherapy with TCx4, on tamoxifen (8/1/16-9/2018), switched to anastrozole 9/2018, exemestane 1/2019. Suffered arthralgias 2/2 AI. Tamoxifen restarted 8/2019\par \par - Breast ca: She is tolerating tamoxifen very well without significant side effects. Reports good compliance. Plan to continue tamoxifen for 10 years (8/2026). GYN and opthal f/u annually due to risk of endometrial ca and cataracts respectively.  No breast imaging needed\par -  Mild hot flashes: 2/2 tamoxifen.  Advised to wear layers and use fan prn. Consider Effexor if get worse.\par - Concern for Wt gain 2/2 tamoxifen:Life style modifications, healthy diet and exercise d/w her\par - Patient is aware of signs and symptoms of DVT/PE. Patient will report if she develops acute onset chest pain shortness of breath, leg swelling or calf pain. \par - Continue to followup with primary care for HTN control. ASA 81 advised\par - Low Vitamin D: concern for worsening bone loss due to low vit D. Discussed to increase Vit D intake \par \par RTC 6m

## 2020-04-06 ENCOUNTER — APPOINTMENT (OUTPATIENT)
Dept: HEMATOLOGY ONCOLOGY | Facility: CLINIC | Age: 50
End: 2020-04-06

## 2020-08-12 ENCOUNTER — OUTPATIENT (OUTPATIENT)
Dept: OUTPATIENT SERVICES | Facility: HOSPITAL | Age: 50
LOS: 1 days | Discharge: HOME | End: 2020-08-12

## 2020-08-12 DIAGNOSIS — Z90.13 ACQUIRED ABSENCE OF BILATERAL BREASTS AND NIPPLES: Chronic | ICD-10-CM

## 2020-08-12 DIAGNOSIS — C50.912 MALIGNANT NEOPLASM OF UNSPECIFIED SITE OF LEFT FEMALE BREAST: ICD-10-CM

## 2020-08-17 ENCOUNTER — APPOINTMENT (OUTPATIENT)
Dept: HEMATOLOGY ONCOLOGY | Facility: CLINIC | Age: 50
End: 2020-08-17
Payer: COMMERCIAL

## 2020-08-17 ENCOUNTER — APPOINTMENT (OUTPATIENT)
Dept: HEMATOLOGY ONCOLOGY | Facility: CLINIC | Age: 50
End: 2020-08-17

## 2020-08-17 PROCEDURE — 99214 OFFICE O/P EST MOD 30 MIN: CPT | Mod: 95

## 2020-08-17 NOTE — HISTORY OF PRESENT ILLNESS
[Disease: _____________________] : Disease: [unfilled] [T: ___] : T[unfilled] [N: ___] : N[unfilled] [M: ___] : M[unfilled] [AJCC Stage: ____] : AJCC Stage: [unfilled] [IA] : IA [de-identified] : Ms. Bermeo is a 49 y/o female with stage IA invasive ductal carcinoma of left breast, ER+/ND+/HER2- s/p bilateral mastectomy and adjuvant chemotherapy with TC x4 here for f/u. Her oncologic history is as follows: \par \par She underwent a screening mammography on 10/16/2015 because of a lump she had palpated in the left breast, which revealed two clusters of heterogeneous microcalcifications in the left breast.  This was followed by an ultrasound  which revealed a 0.9 x 1.3 x 0.6cm poorly marginated/microlobulated, heterogeneously hypoechoic nodule in the left breast, directly beneath the region of interest of the palpable lump, 9:00 position 1 cm from the nipple. She underwent a core biopsy of the nodule on 11/4/2015 which revealed DCIS, solid and cribriform patterns, high grade nuclear atypia and focal necrosis. ER 60%, ND 30%. This biopsy was followed by stereotactic core biopsies\par of the two groupings of calcifications in the left breast on 11/6/2015. Biopsy of ''left 9:00 anterior" revealed  DCIS and biopsy of "left 9:00 posterior" revealed grade 2 IDC along with DCIS. ER 95%, ND 95%, HER2 0/1+. ER/ND repeated on DCIS (but unclear if on anterior or posterior block): ER 95%, ND 95%.\par \par Following the biopsies, she had a bilateral breast MRI on 11/19/15.  The left breast revealed a large ~5cm area of nonmass enhancement extending from the upper inner breast, involving the palpable area. Additionally, there was a 5.3cm rim enhancing mass at the superior posterior central left breast likely a combination of post biopsy and hematoma and residual disease. 4 mm focus of enhancement lower outer left breast. The right breast demonstrated a 11 mm heterogeneous focal nonmass enhancement central right breast. \par Bilateral prominent axillary lymph nodes. She underwent biopsies of both axillary LNs on 12/9/15: right LN: fragmented, unremarkable LN. Left LN: unremarkable breast tissue, no lymphoid tissue identified. She also had a MRI guided core biopsy of the right breast on 12/11/15: benign.  \par \par 1/18/16- Bilateral mastectomy (patient preference; genetics neg) and SLNB with ZANDER reconstruction-  \par Right mastectomy: proliferative fibrocystic changes with usual ductal hyperplasia, calcification, biopsy site changes, columnar cell change, sclerosing adenosis and stromal fibrosis, 0/3 nodes\par Left mastectomy: Invasive ductal carcinoma, stage IA ([id number])\par Size: 3 foci, largest 9 mm (others 4mm, 5mm)   stGstrstastdstest:st st1st LNs: Negative (0/3 sentinel LNs, 0/2 internal mammary LNs)  DCIS: Present  Margins: Negative  LVI: absent \par ER 80%, ND 90%, HER2 negative \par Oncotype: 32\par  \par Adjuvant chemotherapy: TC x4 (3/11/16-5/13/16)\par Tamoxifen (8/1/16-)\par \par Her medical history is notable for iron deficiency anemia for which she received IV iron in November 2015.  She had a CT c/a/p, EGD and colonoscopy, all of which were reportedly normal. She states she has had iron deficiency in the past but never to this degree. No clear etiology of iron deficiency so far. 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 Sherice Ellison for evaluation of hypercoagulable state prior to surgery. Hypercoagulable w/u negative but given her history, prophylactic lovenox was recommended for 4 weeks postop.  [de-identified] : Moderately differentiated invasive ductal carcinoma [de-identified] : Genetics: CancerNext 32 Gene Panel from [facility]: Negative  [de-identified] : ER 80%, NH 90%, HER2 negative  [FreeTextEntry1] : Tamoxifen 8/2016- 9/2018; Anastrazole since 9/2018- 1/2019 exemestane 1/2019.  [de-identified] : Ms. BRE GORE is here for a follow up of left breast cancer on endocrine therapy since 8/2016. She tried AI x 2 after menopause but had to stop due to intolerable s/e. \par She is tolerating tamoxifen well. Good compliance. She denies mood changes, wt gain, vaginal dryness, SOB, chest pain, leg swelling or clotting issues. Her energy and appetite is good, wt stable. Mild hot flashes are manageable. Walking 6 miles a day\par She is postmenopausal. No PMB. GYN - Dr Hansen. Saw 2/2019\par Breast imaging: not needed\par Opthal: She has glasses, seeing next week\par Bone scan  2/2019: negative  for mets \par Dexa scan 5/2019: normal cont ca and vit d

## 2020-08-17 NOTE — PHYSICAL EXAM
[Fully active, able to carry on all pre-disease performance without restriction] : Status 0 - Fully active, able to carry on all pre-disease performance without restriction [Normal] : affect appropriate [de-identified] : Constitutional: well developed, well nourished, in no acute distress. \par Eyes: no icterus seen. no conjunctival injection\par ENT: no tongue swelling, no nasal discharge\par Neck: no visible masses or goitre \par Pulmonary: no audible wheeze,  respirations unlabored\par Musculoskeletal: full range of motion, walking in the house\par Skin: no rash visible on face or upper chest

## 2020-08-17 NOTE — REASON FOR VISIT
[Follow-Up Visit] : a follow-up [Other: _____] : [unfilled] [Home] : at home, [unfilled] , at the time of the visit. [Medical Office: ([facility])___] : at the medical office located in  [Verbal consent obtained from patient] : the patient, [unfilled] [FreeTextEntry2] : breast cancer ER/MD Positive, HER-2 NEGATIVE

## 2020-08-17 NOTE — ASSESSMENT
[Curative] : Goals of care discussed with patient: Curative [FreeTextEntry1] : Ms. Bermeo is a 50 y/o female with stage IA ([id number]) moderately differentiated ER+/MD+/HER2- invasive ductal carcinoma of the left breast s/p bilateral mastectomy, SLNB and ZANDER reconstruction 1/18/16, Oncotype 32, completed adjuvant chemotherapy with TCx4, on tamoxifen (8/1/16-9/2018), switched to anastrozole 9/2018, exemestane 1/2019. Suffered arthralgias 2/2 AI. Tamoxifen restarted 8/2019\par \par - Breast ca: She is tolerating tamoxifen very well without significant side effects. Reports good compliance. Plan to continue tamoxifen for 10 years (8/2026). GYN and opthal f/u annually due to risk of endometrial ca and cataracts respectively.  No breast imaging needed\par -  Mild hot flashes: 2/2 tamoxifen.  Advised to wear layers and use fan prn. Consider Effexor if get worse.\par - Concern for Wt gain 2/2 tamoxifen:Life style modifications, healthy diet and exercise d/w her\par - Patient is aware of signs and symptoms of DVT/PE. Patient will report if she develops acute onset chest pain shortness of breath, leg swelling or calf pain. \par - Continue to followup with primary care for HTN control. ASA 81 advised\par - Low Vitamin D: concern for worsening bone loss due to low vit D. Discussed to increase Vit D intake \par \par RTC 6m \par \par \par [facility] appt at [facility] this [facility]

## 2020-08-18 ENCOUNTER — RESULT REVIEW (OUTPATIENT)
Age: 50
End: 2020-08-18

## 2020-08-18 ENCOUNTER — APPOINTMENT (OUTPATIENT)
Dept: HEMATOLOGY ONCOLOGY | Facility: CLINIC | Age: 50
End: 2020-08-18

## 2020-08-18 LAB
BASOPHILS # BLD AUTO: 0.02 K/UL — SIGNIFICANT CHANGE UP (ref 0–0.2)
BASOPHILS NFR BLD AUTO: 0.4 % — SIGNIFICANT CHANGE UP (ref 0–2)
EOSINOPHIL # BLD AUTO: 0.07 K/UL — SIGNIFICANT CHANGE UP (ref 0–0.5)
EOSINOPHIL NFR BLD AUTO: 1.3 % — SIGNIFICANT CHANGE UP (ref 0–6)
HCT VFR BLD CALC: 34.8 % — SIGNIFICANT CHANGE UP (ref 34.5–45)
HGB BLD-MCNC: 11.5 G/DL — SIGNIFICANT CHANGE UP (ref 11.5–15.5)
IMM GRANULOCYTES NFR BLD AUTO: 0.2 % — SIGNIFICANT CHANGE UP (ref 0–1.5)
LYMPHOCYTES # BLD AUTO: 1.62 K/UL — SIGNIFICANT CHANGE UP (ref 1–3.3)
LYMPHOCYTES # BLD AUTO: 29.1 % — SIGNIFICANT CHANGE UP (ref 13–44)
MCHC RBC-ENTMCNC: 25 PG — LOW (ref 27–34)
MCHC RBC-ENTMCNC: 33 GM/DL — SIGNIFICANT CHANGE UP (ref 32–36)
MCV RBC AUTO: 75.7 FL — LOW (ref 80–100)
MONOCYTES # BLD AUTO: 0.58 K/UL — SIGNIFICANT CHANGE UP (ref 0–0.9)
MONOCYTES NFR BLD AUTO: 10.4 % — SIGNIFICANT CHANGE UP (ref 2–14)
NEUTROPHILS # BLD AUTO: 3.26 K/UL — SIGNIFICANT CHANGE UP (ref 1.8–7.4)
NEUTROPHILS NFR BLD AUTO: 58.6 % — SIGNIFICANT CHANGE UP (ref 43–77)
NRBC # BLD: 0 /100 WBCS — SIGNIFICANT CHANGE UP (ref 0–0)
PLATELET # BLD AUTO: 232 K/UL — SIGNIFICANT CHANGE UP (ref 150–400)
RBC # BLD: 4.6 M/UL — SIGNIFICANT CHANGE UP (ref 3.8–5.2)
RBC # FLD: 14.1 % — SIGNIFICANT CHANGE UP (ref 10.3–14.5)
WBC # BLD: 5.56 K/UL — SIGNIFICANT CHANGE UP (ref 3.8–10.5)
WBC # FLD AUTO: 5.56 K/UL — SIGNIFICANT CHANGE UP (ref 3.8–10.5)

## 2020-08-20 LAB
25(OH)D3 SERPL-MCNC: 31.5 NG/ML
ALBUMIN SERPL ELPH-MCNC: 4.2 G/DL
ALP BLD-CCNC: 48 U/L
ALT SERPL-CCNC: 20 U/L
ANION GAP SERPL CALC-SCNC: 11 MMOL/L
AST SERPL-CCNC: 26 U/L
BILIRUB SERPL-MCNC: 0.2 MG/DL
BUN SERPL-MCNC: 11 MG/DL
CALCIUM SERPL-MCNC: 9 MG/DL
CHLORIDE SERPL-SCNC: 109 MMOL/L
CO2 SERPL-SCNC: 23 MMOL/L
CREAT SERPL-MCNC: 0.77 MG/DL
GLUCOSE SERPL-MCNC: 85 MG/DL
POTASSIUM SERPL-SCNC: 3.8 MMOL/L
PROT SERPL-MCNC: 7.3 G/DL
SODIUM SERPL-SCNC: 143 MMOL/L

## 2020-09-21 ENCOUNTER — EMERGENCY (EMERGENCY)
Facility: HOSPITAL | Age: 50
LOS: 1 days | Discharge: HOME | End: 2020-09-21
Attending: EMERGENCY MEDICINE | Admitting: EMERGENCY MEDICINE
Payer: COMMERCIAL

## 2020-09-21 VITALS
SYSTOLIC BLOOD PRESSURE: 171 MMHG | DIASTOLIC BLOOD PRESSURE: 101 MMHG | TEMPERATURE: 98 F | HEIGHT: 65 IN | HEART RATE: 77 BPM | OXYGEN SATURATION: 100 % | RESPIRATION RATE: 17 BRPM

## 2020-09-21 VITALS
SYSTOLIC BLOOD PRESSURE: 151 MMHG | TEMPERATURE: 98 F | OXYGEN SATURATION: 100 % | RESPIRATION RATE: 16 BRPM | DIASTOLIC BLOOD PRESSURE: 93 MMHG | HEART RATE: 65 BPM

## 2020-09-21 DIAGNOSIS — Z90.13 ACQUIRED ABSENCE OF BILATERAL BREASTS AND NIPPLES: Chronic | ICD-10-CM

## 2020-09-21 PROCEDURE — 99283 EMERGENCY DEPT VISIT LOW MDM: CPT

## 2020-09-21 PROCEDURE — 72100 X-RAY EXAM L-S SPINE 2/3 VWS: CPT | Mod: 26

## 2020-09-21 RX ORDER — IBUPROFEN 200 MG
400 TABLET ORAL ONCE
Refills: 0 | Status: COMPLETED | OUTPATIENT
Start: 2020-09-21 | End: 2020-09-21

## 2020-09-21 RX ORDER — LIDOCAINE 4 G/100G
1 CREAM TOPICAL ONCE
Refills: 0 | Status: COMPLETED | OUTPATIENT
Start: 2020-09-21 | End: 2020-09-21

## 2020-09-21 RX ORDER — OXYCODONE AND ACETAMINOPHEN 5; 325 MG/1; MG/1
1 TABLET ORAL ONCE
Refills: 0 | Status: DISCONTINUED | OUTPATIENT
Start: 2020-09-21 | End: 2020-09-21

## 2020-09-21 RX ADMIN — Medication 400 MILLIGRAM(S): at 12:21

## 2020-09-21 RX ADMIN — LIDOCAINE 1 PATCH: 4 CREAM TOPICAL at 12:21

## 2020-09-21 NOTE — ED PROVIDER NOTE - PHYSICAL EXAMINATION
CONSTITUTIONAL: Non-toxic, non-diaphoretic, in no apparent distress  HEAD: Normocephalic; atruamatic  EYES: EOM intact   ENMT: External appears normal; normal oropharynx, moist  NECK: grossly normal active ROM,  CARD: No cyanosis, good peripheral perfusion, RRR  RESP: Normal chest excursion with respiration; no increased work of breathing  ABD: non-distended   EXT: moving all extremities, no gross disfigurement or asymmetry,  SKIN: Warm, dry, no rash  NEURO:  moving all extremities, no facial droop, no dysarthria      cn2-12 intact  5/5 strength in both lower extremities   sensation intact

## 2020-09-21 NOTE — ED PROVIDER NOTE - PATIENT PORTAL LINK FT
You can access the FollowMyHealth Patient Portal offered by [facility] [place] by registering at the following website: [email or web address]. By joining [facility]’s FollowMyHealth portal, you will also be able to view your health information using other applications (apps) compatible with our system.

## 2020-09-21 NOTE — ED ADULT TRIAGE NOTE - CHIEF COMPLAINT QUOTE
Pt states while lifting a heavy container pt injured back. Pt appears uncomfortable in triage. Pt hx breast CA currently receiving treatment

## 2020-09-21 NOTE — ED PROVIDER NOTE - ATTENDING CONTRIBUTION TO CARE
Attending Statement: I have personally seen and examined this patient. I have fully participated in the care of this patient. I have reviewed all pertinent clinical information, including history physical exam, plan and the Resident's note and agree except as noted  49yo F hx of breast ca sp resection in remission pw lower back pain since this morning. located on the  right lower back after lifting a box. Feels radiates down the right buttocks, denies numbness or weakness. worse with movement. no urine or bowel incontinence. no fever/chills. no cp or sob. no abdominal pain. no n/v.   Vital signs noted. no distress. tender in the right lumbar para lumbar area no step off neg straight leg nl strength of bl lower ext  soft nontender abdomen. no  rebound. no guarding. no sign of trauma. no CVAT   plan pain med, re assess pt has family taking her home. no red flags neurologically intact.

## 2020-09-21 NOTE — ED ADULT NURSE NOTE - OBJECTIVE STATEMENT
Pt a&ox3, calm, cooperative. c/o of mid lower back pain, radiates toward right hip and leg, started today after a heavy lift. Pt denies chest pain, n/v/d, weakness, urinary/bm difficulty. Respirations even/unlabored, appears uncomfortable, equal strength to b/l lower extremities. MD Will at bedside to eval. will continue to monitor

## 2020-09-21 NOTE — ED PROVIDER NOTE - NS ED ROS FT
CONSTITUTIONAL: No fever,  EYES: No redness  ENT: no sore throat  CARDIOVASCULAR: No chest pain,  RESPIRATORY: No cough, no shortness of breath  GI: No abdominal pain, no nausea, no vomiting,  GENITOURINARY: No dysuria  MUSKULOSKELETAL: +++ new pain in joints/muscles  SKIN: No rash  NEURO: No headache  ALL OTHER SYSTEMS NEGATIVE.

## 2020-09-21 NOTE — ED PROVIDER NOTE - NSFOLLOWUPINSTRUCTIONS_ED_ALL_ED_FT
Back Pain    WHAT YOU NEED TO KNOW:    Back pain is common. It can be caused by many conditions, such as arthritis or the breakdown of spinal discs. Your risk for back pain is increased by injuries, lack of activity, or repeated bending and twisting. You may feel sore or stiff on one or both sides of your back. The pain may spread to your buttocks or thighs.    DISCHARGE INSTRUCTIONS:    Return to the emergency department if:    You have pain, numbness, or weakness in one or both legs.    Your pain becomes so severe that you cannot walk.    You cannot control your urine or bowel movements.    You have severe back pain with chest pain.    You have severe back pain, nausea, and vomiting.    You have severe back pain that spreads to your side or genital area.  Contact your healthcare provider if:    You have back pain that does not get better with rest and pain medicine.    You have a fever.    You have pain that worsens when you are on your back or when you rest.    You have pain that worsens when you cough or sneeze.    You lose weight without trying.    You have questions or concerns about your condition or care.  Medicines:    NSAIDs help decrease swelling and pain. This medicine is available with or without a doctor's order. NSAIDs can cause stomach bleeding or kidney problems in certain people. If you take blood thinner medicine, always ask your healthcare provider if NSAIDs are safe for you. Always read the medicine label and follow directions.    Acetaminophen decreases pain and fever. It is available without a doctor's order. Ask how much to take and how often to take it. Follow directions. Read the labels of all other medicines you are using to see if they also contain acetaminophen, or ask your doctor or pharmacist. Acetaminophen can cause liver damage if not taken correctly. Do not use more than 4 grams (4,000 milligrams) total of acetaminophen in one day.    Muscle relaxers help decrease muscle spasms and back pain.    Prescription pain medicine may be given. Ask your healthcare provider how to take this medicine safely. Some prescription pain medicines contain acetaminophen. Do not take other medicines that contain acetaminophen without talking to your healthcare provider. Too much acetaminophen may cause liver damage. Prescription pain medicine may cause constipation. Ask your healthcare provider how to prevent or treat constipation.    Take your medicine as directed. Contact your healthcare provider if you think your medicine is not helping or if you have side effects. Tell him or her if you are allergic to any medicine. Keep a list of the medicines, vitamins, and herbs you take. Include the amounts, and when and why you take them. Bring the list or the pill bottles to follow-up visits. Carry your medicine list with you in case of an emergency.  How to manage your back pain:    Apply ice on your back for 15 to 20 minutes every hour or as directed. Use an ice pack, or put crushed ice in a plastic bag. Cover it with a towel before you apply it to your skin. Ice helps prevent tissue damage and decreases pain.    Apply heat on your back for 20 to 30 minutes every 2 hours for as many days as directed. Heat helps decrease pain and muscle spasms.    Stay active as much as you can without causing more pain. Bed rest could make your back pain worse. Avoid heavy lifting until your pain is gone.    Go to physical therapy as directed. A physical therapist can teach you exercises to help improve movement and strength, and to decrease pain.  Follow up with your healthcare provider in 2 weeks, or as directed: Write down your questions so you remember to ask them during your visits.

## 2020-11-09 ENCOUNTER — APPOINTMENT (OUTPATIENT)
Dept: OBGYN | Facility: CLINIC | Age: 50
End: 2020-11-09
Payer: COMMERCIAL

## 2020-11-09 VITALS
BODY MASS INDEX: 29.28 KG/M2 | SYSTOLIC BLOOD PRESSURE: 137 MMHG | TEMPERATURE: 98 F | WEIGHT: 182.19 LBS | HEIGHT: 66 IN | HEART RATE: 89 BPM | DIASTOLIC BLOOD PRESSURE: 88 MMHG

## 2020-11-09 DIAGNOSIS — Z78.0 ASYMPTOMATIC MENOPAUSAL STATE: ICD-10-CM

## 2020-11-09 DIAGNOSIS — Z01.419 ENCOUNTER FOR GYNECOLOGICAL EXAMINATION (GENERAL) (ROUTINE) W/OUT ABNORMAL FINDINGS: ICD-10-CM

## 2020-11-09 PROCEDURE — 99072 ADDL SUPL MATRL&STAF TM PHE: CPT

## 2020-11-09 PROCEDURE — 99396 PREV VISIT EST AGE 40-64: CPT

## 2020-11-09 NOTE — PHYSICAL EXAM
[Labia Majora] : normal [Labia Minora] : normal [Atrophy] : atrophy [Normal] : normal [Retroversion] : retroverted [Uterine Adnexae] : normal

## 2020-11-09 NOTE — DISCUSSION/SUMMARY
[FreeTextEntry1] : A - WWV\par      breast cancer\par      menopause \par \par P- exercise encouraged\par      nutritional counseling provided\par      pap done\par     referral for pelvic sono given\par      next Dexa due in 2022\par      pt given referral for colonoscopy \par      all questions answered

## 2020-11-09 NOTE — HISTORY OF PRESENT ILLNESS
[FreeTextEntry1] : 50 y.o. P [id number] postmenopausal female for annual exam. pt has hx of breast cancer , s/p bilateral mastectomy.in Jan. 2016,  s/p chemotherapy , now on Tamoxifen  for a total of 10 years. pt has HTN on Amlodipine 5mg. PCP is Dr. Parish Smyth in [facility]. pt is s/p BTL. pt has 4 male children . Oncologist is Dr. Kamryn Ellison. . pt had colonoscopy  Nov. 2015. pt had Dexa in 2019, next due in 2022 pt is due for pap .

## 2020-11-10 LAB — HPV HIGH+LOW RISK DNA PNL CVX: NOT DETECTED

## 2020-11-13 LAB — CYTOLOGY CVX/VAG DOC THIN PREP: NORMAL

## 2020-11-21 ENCOUNTER — APPOINTMENT (OUTPATIENT)
Dept: ULTRASOUND IMAGING | Facility: IMAGING CENTER | Age: 50
End: 2020-11-21
Payer: COMMERCIAL

## 2020-11-21 ENCOUNTER — OUTPATIENT (OUTPATIENT)
Dept: OUTPATIENT SERVICES | Facility: HOSPITAL | Age: 50
LOS: 1 days | End: 2020-11-21
Payer: COMMERCIAL

## 2020-11-21 DIAGNOSIS — Z00.8 ENCOUNTER FOR OTHER GENERAL EXAMINATION: ICD-10-CM

## 2020-11-21 DIAGNOSIS — Z78.0 ASYMPTOMATIC MENOPAUSAL STATE: ICD-10-CM

## 2020-11-21 DIAGNOSIS — Z90.13 ACQUIRED ABSENCE OF BILATERAL BREASTS AND NIPPLES: Chronic | ICD-10-CM

## 2020-11-21 PROCEDURE — 76830 TRANSVAGINAL US NON-OB: CPT

## 2020-11-21 PROCEDURE — 76830 TRANSVAGINAL US NON-OB: CPT | Mod: 26

## 2020-12-23 PROBLEM — Z01.419 ENCOUNTER FOR ROUTINE GYNECOLOGICAL EXAMINATION: Status: RESOLVED | Noted: 2017-05-08 | Resolved: 2020-12-23

## 2021-02-19 ENCOUNTER — OUTPATIENT (OUTPATIENT)
Dept: OUTPATIENT SERVICES | Facility: HOSPITAL | Age: 51
LOS: 1 days | Discharge: HOME | End: 2021-02-19

## 2021-02-19 DIAGNOSIS — C50.912 MALIGNANT NEOPLASM OF UNSPECIFIED SITE OF LEFT FEMALE BREAST: ICD-10-CM

## 2021-02-19 DIAGNOSIS — Z90.13 ACQUIRED ABSENCE OF BILATERAL BREASTS AND NIPPLES: Chronic | ICD-10-CM

## 2021-02-22 ENCOUNTER — APPOINTMENT (OUTPATIENT)
Dept: HEMATOLOGY ONCOLOGY | Facility: CLINIC | Age: 51
End: 2021-02-22

## 2021-07-20 ENCOUNTER — OUTPATIENT (OUTPATIENT)
Dept: OUTPATIENT SERVICES | Facility: HOSPITAL | Age: 51
LOS: 1 days | Discharge: HOME | End: 2021-07-20

## 2021-07-20 DIAGNOSIS — Z90.13 ACQUIRED ABSENCE OF BILATERAL BREASTS AND NIPPLES: Chronic | ICD-10-CM

## 2021-07-20 DIAGNOSIS — C50.912 MALIGNANT NEOPLASM OF UNSPECIFIED SITE OF LEFT FEMALE BREAST: ICD-10-CM

## 2021-07-21 ENCOUNTER — RESULT REVIEW (OUTPATIENT)
Age: 51
End: 2021-07-21

## 2021-07-21 ENCOUNTER — APPOINTMENT (OUTPATIENT)
Dept: HEMATOLOGY ONCOLOGY | Facility: CLINIC | Age: 51
End: 2021-07-21
Payer: COMMERCIAL

## 2021-07-21 VITALS
WEIGHT: 178.57 LBS | BODY MASS INDEX: 28.7 KG/M2 | SYSTOLIC BLOOD PRESSURE: 133 MMHG | HEIGHT: 65.98 IN | HEART RATE: 88 BPM | RESPIRATION RATE: 16 BRPM | TEMPERATURE: 97.7 F | DIASTOLIC BLOOD PRESSURE: 89 MMHG | OXYGEN SATURATION: 96 %

## 2021-07-21 LAB
BASOPHILS # BLD AUTO: 0.02 K/UL — SIGNIFICANT CHANGE UP (ref 0–0.2)
BASOPHILS NFR BLD AUTO: 0.4 % — SIGNIFICANT CHANGE UP (ref 0–2)
EOSINOPHIL # BLD AUTO: 0.05 K/UL — SIGNIFICANT CHANGE UP (ref 0–0.5)
EOSINOPHIL NFR BLD AUTO: 1 % — SIGNIFICANT CHANGE UP (ref 0–6)
HCT VFR BLD CALC: 34.1 % — LOW (ref 34.5–45)
HGB BLD-MCNC: 11 G/DL — LOW (ref 11.5–15.5)
IMM GRANULOCYTES NFR BLD AUTO: 0.4 % — SIGNIFICANT CHANGE UP (ref 0–1.5)
LYMPHOCYTES # BLD AUTO: 1.69 K/UL — SIGNIFICANT CHANGE UP (ref 1–3.3)
LYMPHOCYTES # BLD AUTO: 35.4 % — SIGNIFICANT CHANGE UP (ref 13–44)
MCHC RBC-ENTMCNC: 24.3 PG — LOW (ref 27–34)
MCHC RBC-ENTMCNC: 32.3 G/DL — SIGNIFICANT CHANGE UP (ref 32–36)
MCV RBC AUTO: 75.4 FL — LOW (ref 80–100)
MONOCYTES # BLD AUTO: 0.45 K/UL — SIGNIFICANT CHANGE UP (ref 0–0.9)
MONOCYTES NFR BLD AUTO: 9.4 % — SIGNIFICANT CHANGE UP (ref 2–14)
NEUTROPHILS # BLD AUTO: 2.54 K/UL — SIGNIFICANT CHANGE UP (ref 1.8–7.4)
NEUTROPHILS NFR BLD AUTO: 53.4 % — SIGNIFICANT CHANGE UP (ref 43–77)
NRBC # BLD: 0 /100 WBCS — SIGNIFICANT CHANGE UP (ref 0–0)
PLATELET # BLD AUTO: 203 K/UL — SIGNIFICANT CHANGE UP (ref 150–400)
RBC # BLD: 4.52 M/UL — SIGNIFICANT CHANGE UP (ref 3.8–5.2)
RBC # FLD: 14 % — SIGNIFICANT CHANGE UP (ref 10.3–14.5)
WBC # BLD: 4.77 K/UL — SIGNIFICANT CHANGE UP (ref 3.8–10.5)
WBC # FLD AUTO: 4.77 K/UL — SIGNIFICANT CHANGE UP (ref 3.8–10.5)

## 2021-07-21 PROCEDURE — 99214 OFFICE O/P EST MOD 30 MIN: CPT

## 2021-07-21 PROCEDURE — 99072 ADDL SUPL MATRL&STAF TM PHE: CPT

## 2021-07-21 NOTE — REASON FOR VISIT
[Follow-Up Visit] : a follow-up [Other: _____] : [unfilled] [Home] : at home, [unfilled] , at the time of the visit. [Medical Office: ([facility])___] : at the medical office located in  [Verbal consent obtained from patient] : the patient, [unfilled] [FreeTextEntry2] : breast cancer ER/NV Positive, HER-2 NEGATIVE

## 2021-07-21 NOTE — PHYSICAL EXAM
[Fully active, able to carry on all pre-disease performance without restriction] : Status 0 - Fully active, able to carry on all pre-disease performance without restriction [Normal] : normal appearance, no rash, nodules, vesicles, ulcers, erythema [de-identified] : BL mastectomies with reconstruction, no chest wall or axillary nodules

## 2021-07-21 NOTE — ASSESSMENT
[Curative] : Goals of care discussed with patient: Curative [FreeTextEntry1] : Ms. Bermeo is a 51 y/o female with stage IA ([id number]) moderately differentiated ER+/FL+/HER2- invasive ductal carcinoma of the left breast s/p bilateral mastectomy, SLNB and ZANDER reconstruction 1/18/16, Oncotype 32, completed adjuvant chemotherapy with TCx4, on tamoxifen (8/1/16-9/2018), switched to anastrozole 9/2018, exemestane 1/2019. Suffered arthralgias 2/2 AI. Tamoxifen restarted 8/2019\par \par - Breast ca: She is tolerating tamoxifen very well without significant side effects. Reports good compliance. Plan to continue tamoxifen for 10 years (8/2026). GYN and opthal f/u annually due to risk of endometrial ca and cataracts respectively.  No breast imaging needed. She has complete 5 yrs of endocrine therapy but we discussed that tamoxifen is typically for 10 yrs. Pt in agreement to continue x 10 yrs\par - Noted to have mild anemia, no GI bleeding. rec C scope and repeat CBC in 2-3 w\par -  Mild hot flashes: 2/2 tamoxifen.  Advised to wear layers and use fan prn. Consider Effexor if get worse.\par - Concern for Wt gain 2/2 tamoxifen:Life style modifications, healthy diet and exercise d/w her\par - Patient is aware of signs and symptoms of DVT/PE. Patient will report if she develops acute onset chest pain shortness of breath, leg swelling or calf pain. \par - Continue to followup with primary care for HTN control. ASA 81 advised\par - Low Vitamin D: concern for worsening bone loss due to low vit D. Discussed to increase Vit D intake \par \par RTC 6m

## 2021-07-21 NOTE — HISTORY OF PRESENT ILLNESS
[Disease: _____________________] : Disease: [unfilled] [T: ___] : T[unfilled] [N: ___] : N[unfilled] [M: ___] : M[unfilled] [AJCC Stage: ____] : AJCC Stage: [unfilled] [IA] : IA [de-identified] : Ms. Bermeo is a 47 y/o female with stage IA invasive ductal carcinoma of left breast, ER+/MN+/HER2- s/p bilateral mastectomy and adjuvant chemotherapy with TC x4 here for f/u. Her oncologic history is as follows: \par \par She underwent a screening mammography on 10/16/2015 because of a lump she had palpated in the left breast, which revealed two clusters of heterogeneous microcalcifications in the left breast.  This was followed by an ultrasound  which revealed a 0.9 x 1.3 x 0.6cm poorly marginated/microlobulated, heterogeneously hypoechoic nodule in the left breast, directly beneath the region of interest of the palpable lump, 9:00 position 1 cm from the nipple. She underwent a core biopsy of the nodule on 11/4/2015 which revealed DCIS, solid and cribriform patterns, high grade nuclear atypia and focal necrosis. ER 60%, MN 30%. This biopsy was followed by stereotactic core biopsies\par of the two groupings of calcifications in the left breast on 11/6/2015. Biopsy of ''left 9:00 anterior" revealed  DCIS and biopsy of "left 9:00 posterior" revealed grade 2 IDC along with DCIS. ER 95%, MN 95%, HER2 0/1+. ER/MN repeated on DCIS (but unclear if on anterior or posterior block): ER 95%, MN 95%.\par \par Following the biopsies, she had a bilateral breast MRI on 11/19/15.  The left breast revealed a large ~5cm area of nonmass enhancement extending from the upper inner breast, involving the palpable area. Additionally, there was a 5.3cm rim enhancing mass at the superior posterior central left breast likely a combination of post biopsy and hematoma and residual disease. 4 mm focus of enhancement lower outer left breast. The right breast demonstrated a 11 mm heterogeneous focal nonmass enhancement central right breast. \par Bilateral prominent axillary lymph nodes. She underwent biopsies of both axillary LNs on 12/9/15: right LN: fragmented, unremarkable LN. Left LN: unremarkable breast tissue, no lymphoid tissue identified. She also had a MRI guided core biopsy of the right breast on 12/11/15: benign.  \par \par 1/18/16- Bilateral mastectomy (patient preference; genetics neg) and SLNB with ZANDER reconstruction-  \par Right mastectomy: proliferative fibrocystic changes with usual ductal hyperplasia, calcification, biopsy site changes, columnar cell change, sclerosing adenosis and stromal fibrosis, 0/3 nodes\par Left mastectomy: Invasive ductal carcinoma, stage IA ([id number])\par Size: 3 foci, largest 9 mm (others 4mm, 5mm)   stGstrstastdstest:st st1st LNs: Negative (0/3 sentinel LNs, 0/2 internal mammary LNs)  DCIS: Present  Margins: Negative  LVI: absent \par ER 80%, MN 90%, HER2 negative \par Oncotype: 32\par  \par Adjuvant chemotherapy: TC x4 (3/11/16-5/13/16)\par Tamoxifen (8/1/16-)\par \par Her medical history is notable for iron deficiency anemia for which she received IV iron in November 2015.  She had a CT c/a/p, EGD and colonoscopy, all of which were reportedly normal. She states she has had iron deficiency in the past but never to this degree. No clear etiology of iron deficiency so far. 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 Sherice Ellison for evaluation of hypercoagulable state prior to surgery. Hypercoagulable w/u negative but given her history, prophylactic lovenox was recommended for 4 weeks postop.  [de-identified] : Moderately differentiated invasive ductal carcinoma [de-identified] : ER 80%, CA 90%, HER2 negative  [de-identified] : Genetics: CancerNext 32 Gene Panel from [facility]: Negative  [FreeTextEntry1] : Tamoxifen 8/2016- 9/2018; Anastrazole since 9/2018- 1/2019 exemestane 1/2019.  [de-identified] : Ms. BRE GORE is here for a follow up of left breast cancer on endocrine therapy since 8/2016. She tried AI x 2 after menopause but had to stop due to intolerable s/e. \par She is tolerating tamoxifen well. Good compliance. She denies mood changes, wt gain, vaginal dryness, SOB, chest pain, leg swelling or clotting issues. Her energy and appetite is good, wt stable. Mild hot flashes are manageable. Walking 6 miles a day\par She is postmenopausal. No PMB. GYN - Dr Hansen. Saw 11/2020\par Breast imaging: not needed\par Opthal: She has glasses, seeing next week\par Bone scan  2/2019: negative  for mets \par Dexa scan 5/2019: normal cont ca and vit d \par She has complete 5 yrs of endocrine therapy but we discussed that tamoxifen is typically for 10 yrs. Pt in agreement to continue x 10 yrs\par Noted to have mild anemia, no GI bleeding. rec C scope and repeat CBC in 2-3 w

## 2021-07-22 ENCOUNTER — NON-APPOINTMENT (OUTPATIENT)
Age: 51
End: 2021-07-22

## 2021-07-22 LAB
25(OH)D3 SERPL-MCNC: 44.2 NG/ML
ALBUMIN SERPL ELPH-MCNC: 4.3 G/DL
ALP BLD-CCNC: 52 U/L
ALT SERPL-CCNC: 16 U/L
ANION GAP SERPL CALC-SCNC: 13 MMOL/L
AST SERPL-CCNC: 23 U/L
BILIRUB SERPL-MCNC: 0.3 MG/DL
BUN SERPL-MCNC: 15 MG/DL
CALCIUM SERPL-MCNC: 9.1 MG/DL
CHLORIDE SERPL-SCNC: 106 MMOL/L
CO2 SERPL-SCNC: 23 MMOL/L
CREAT SERPL-MCNC: 0.8 MG/DL
GLUCOSE SERPL-MCNC: 93 MG/DL
POTASSIUM SERPL-SCNC: 3.7 MMOL/L
PROT SERPL-MCNC: 7.6 G/DL
SODIUM SERPL-SCNC: 142 MMOL/L

## 2021-08-25 ENCOUNTER — OUTPATIENT (OUTPATIENT)
Dept: OUTPATIENT SERVICES | Facility: HOSPITAL | Age: 51
LOS: 1 days | Discharge: HOME | End: 2021-08-25

## 2021-08-25 DIAGNOSIS — C50.912 MALIGNANT NEOPLASM OF UNSPECIFIED SITE OF LEFT FEMALE BREAST: ICD-10-CM

## 2021-08-25 DIAGNOSIS — Z90.13 ACQUIRED ABSENCE OF BILATERAL BREASTS AND NIPPLES: Chronic | ICD-10-CM

## 2021-08-26 ENCOUNTER — LABORATORY RESULT (OUTPATIENT)
Age: 51
End: 2021-08-26

## 2021-08-26 ENCOUNTER — APPOINTMENT (OUTPATIENT)
Dept: HEMATOLOGY ONCOLOGY | Facility: CLINIC | Age: 51
End: 2021-08-26

## 2021-08-26 ENCOUNTER — RESULT REVIEW (OUTPATIENT)
Age: 51
End: 2021-08-26

## 2021-08-26 LAB
BASOPHILS # BLD AUTO: 0.01 K/UL — SIGNIFICANT CHANGE UP (ref 0–0.2)
BASOPHILS NFR BLD AUTO: 0.2 % — SIGNIFICANT CHANGE UP (ref 0–2)
EOSINOPHIL # BLD AUTO: 0.06 K/UL — SIGNIFICANT CHANGE UP (ref 0–0.5)
EOSINOPHIL NFR BLD AUTO: 1.5 % — SIGNIFICANT CHANGE UP (ref 0–6)
HCT VFR BLD CALC: 34.1 % — LOW (ref 34.5–45)
HGB BLD-MCNC: 10.8 G/DL — LOW (ref 11.5–15.5)
IMM GRANULOCYTES NFR BLD AUTO: 0.2 % — SIGNIFICANT CHANGE UP (ref 0–1.5)
LYMPHOCYTES # BLD AUTO: 1.71 K/UL — SIGNIFICANT CHANGE UP (ref 1–3.3)
LYMPHOCYTES # BLD AUTO: 42.1 % — SIGNIFICANT CHANGE UP (ref 13–44)
MCHC RBC-ENTMCNC: 24.2 PG — LOW (ref 27–34)
MCHC RBC-ENTMCNC: 31.7 G/DL — LOW (ref 32–36)
MCV RBC AUTO: 76.5 FL — LOW (ref 80–100)
MONOCYTES # BLD AUTO: 0.39 K/UL — SIGNIFICANT CHANGE UP (ref 0–0.9)
MONOCYTES NFR BLD AUTO: 9.6 % — SIGNIFICANT CHANGE UP (ref 2–14)
NEUTROPHILS # BLD AUTO: 1.88 K/UL — SIGNIFICANT CHANGE UP (ref 1.8–7.4)
NEUTROPHILS NFR BLD AUTO: 46.4 % — SIGNIFICANT CHANGE UP (ref 43–77)
NRBC # BLD: 0 /100 WBCS — SIGNIFICANT CHANGE UP (ref 0–0)
PLATELET # BLD AUTO: 208 K/UL — SIGNIFICANT CHANGE UP (ref 150–400)
RBC # BLD: 4.46 M/UL — SIGNIFICANT CHANGE UP (ref 3.8–5.2)
RBC # FLD: 14.7 % — HIGH (ref 10.3–14.5)
WBC # BLD: 4.06 K/UL — SIGNIFICANT CHANGE UP (ref 3.8–10.5)
WBC # FLD AUTO: 4.06 K/UL — SIGNIFICANT CHANGE UP (ref 3.8–10.5)

## 2021-09-14 ENCOUNTER — NON-APPOINTMENT (OUTPATIENT)
Age: 51
End: 2021-09-14

## 2021-09-14 LAB
FERRITIN SERPL-MCNC: 35 NG/ML
FOLATE SERPL-MCNC: >20 NG/ML
VIT B12 SERPL-MCNC: 1264 PG/ML

## 2021-09-15 ENCOUNTER — APPOINTMENT (OUTPATIENT)
Dept: GASTROENTEROLOGY | Facility: CLINIC | Age: 51
End: 2021-09-15
Payer: COMMERCIAL

## 2021-09-15 VITALS
OXYGEN SATURATION: 98 % | HEART RATE: 83 BPM | WEIGHT: 178 LBS | HEIGHT: 65 IN | DIASTOLIC BLOOD PRESSURE: 95 MMHG | BODY MASS INDEX: 29.66 KG/M2 | TEMPERATURE: 97.9 F | SYSTOLIC BLOOD PRESSURE: 129 MMHG

## 2021-09-15 DIAGNOSIS — Z12.11 ENCOUNTER FOR SCREENING FOR MALIGNANT NEOPLASM OF COLON: ICD-10-CM

## 2021-09-15 DIAGNOSIS — Z20.822 CONTACT WITH AND (SUSPECTED) EXPOSURE TO COVID-19: ICD-10-CM

## 2021-09-15 PROCEDURE — 99204 OFFICE O/P NEW MOD 45 MIN: CPT

## 2021-09-15 NOTE — HISTORY OF PRESENT ILLNESS
[Heartburn] : denies heartburn [Nausea] : denies nausea [Vomiting] : denies vomiting [Diarrhea] : denies diarrhea [Constipation] : denies constipation [Yellow Skin Or Eyes (Jaundice)] : denies jaundice [Abdominal Pain] : denies abdominal pain [Abdominal Swelling] : denies abdominal swelling [Rectal Pain] : denies rectal pain [Malignancy] : malignancy [Abdominal Surgery] : abdominal surgery [GERD] : no gastroesophageal reflux disease [Hiatus Hernia] : no hiatus hernia [Peptic Ulcer Disease] : no peptic ulcer disease [Pancreatitis] : no pancreatitis [Cholelithiasis] : no cholelithiasis [Kidney Stone] : no kidney stone [Inflammatory Bowel Disease] : no inflammatory bowel disease [Irritable Bowel Syndrome] : no irritable bowel syndrome [Diverticulitis] : no diverticulitis [Alcohol Abuse] : no alcohol abuse [Appendectomy] : no appendectomy [Cholecystectomy] : no cholecystectomy [de-identified] : 51 year old female presents for evaluation of anemia. Her medical history is notable for iron deficiency anemia for which she received IV iron in November 2015. She had a CT c/a/p, EGD and colonoscopy, all of which were reportedly normal. She states she has had iron deficiency in the past but never to this degree. No clear etiology of iron deficiency so far. 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 Sherice Ellison for evaluation of hypercoagulable state prior to surgery. Hypercoagulable w/u negative but given her history, prophylactic lovenox was recommended for 4 weeks postop. She is not currently on blood thinners and have not been since 2015 as per patient. She has stage IA invasive ductal carcinoma of left breast- s/p bilateral mastectomy and adjuvant chemotherapy. Bowel movement are daily and regular without difficulty or strain. Denies rectal bleeding, blood in the stool, melena, or hematemesis. \par \par Last CBC drawn on 9/14/21 H/H 10.8/34.1. Iron Sat 11 %. Ferritin 35. B12 1264. \par \par  [de-identified] : Breast Cancer

## 2021-09-15 NOTE — PHYSICAL EXAM
Adequate: hears normal conversation without difficulty [General Appearance - Alert] : alert [General Appearance - In No Acute Distress] : in no acute distress [Sclera] : the sclera and conjunctiva were normal [PERRL With Normal Accommodation] : pupils were equal in size, round, and reactive to light [Extraocular Movements] : extraocular movements were intact [Outer Ear] : the ears and nose were normal in appearance [Oropharynx] : the oropharynx was normal [Neck Appearance] : the appearance of the neck was normal [Neck Cervical Mass (___cm)] : no neck mass was observed [Jugular Venous Distention Increased] : there was no jugular-venous distention [Thyroid Diffuse Enlargement] : the thyroid was not enlarged [Thyroid Nodule] : there were no palpable thyroid nodules [] : no respiratory distress [Auscultation Breath Sounds / Voice Sounds] : lungs were clear to auscultation bilaterally [Heart Rate And Rhythm] : heart rate was normal and rhythm regular [Heart Sounds] : normal S1 and S2 [Heart Sounds Gallop] : no gallops [Murmurs] : no murmurs [Heart Sounds Pericardial Friction Rub] : no pericardial rub [Flat] : flat [Obese] : obese [Soft, Nontender] : the abdomen was soft and nontender [Normal] : normal to percussion [Cervical Lymph Nodes Enlarged Posterior Bilaterally] : posterior cervical [Cervical Lymph Nodes Enlarged Anterior Bilaterally] : anterior cervical [Supraclavicular Lymph Nodes Enlarged Bilaterally] : supraclavicular [Axillary Lymph Nodes Enlarged Bilaterally] : axillary [Femoral Lymph Nodes Enlarged Bilaterally] : femoral [Inguinal Lymph Nodes Enlarged Bilaterally] : inguinal [No CVA Tenderness] : no ~M costovertebral angle tenderness [No Spinal Tenderness] : no spinal tenderness [Abnormal Walk] : normal gait [Nail Clubbing] : no clubbing  or cyanosis of the fingernails [Musculoskeletal - Swelling] : no joint swelling seen [Motor Tone] : muscle strength and tone were normal [Deep Tendon Reflexes (DTR)] : deep tendon reflexes were 2+ and symmetric [Sensation] : the sensory exam was normal to light touch and pinprick [No Focal Deficits] : no focal deficits [Oriented To Time, Place, And Person] : oriented to person, place, and time [Impaired Insight] : insight and judgment were intact [Affect] : the affect was normal [Firm] : not firm [Rigid] : not rigid [Rebound] : no rebound [Guarding] : no guarding [Del Toro's] : a negative Del Toro's sign

## 2021-09-15 NOTE — ASSESSMENT
[FreeTextEntry1] : Attending Attestation \par \par Anemia \par \par EGD and Colonoscopy procedures ordered The risks benefits alternatives and complications of the procedure/s were explained to the patient at length. The patient was agreeable and we will proceed. Preparation discussed reviewed side effects and adverse reactions. \par \par Patient made aware for possible Capsule EGD if both ECO is negative. \par \par Patient verbalized understanding of all information provided. All questions answered and reviewed. \par \par

## 2021-09-20 ENCOUNTER — APPOINTMENT (OUTPATIENT)
Dept: DERMATOLOGY | Facility: CLINIC | Age: 51
End: 2021-09-20
Payer: COMMERCIAL

## 2021-09-20 DIAGNOSIS — D22.9 MELANOCYTIC NEVI, UNSPECIFIED: ICD-10-CM

## 2021-09-20 DIAGNOSIS — L81.8 OTHER SPECIFIED DISORDERS OF PIGMENTATION: ICD-10-CM

## 2021-09-20 DIAGNOSIS — L82.1 OTHER SEBORRHEIC KERATOSIS: ICD-10-CM

## 2021-09-20 DIAGNOSIS — Z12.83 ENCOUNTER FOR SCREENING FOR MALIGNANT NEOPLASM OF SKIN: ICD-10-CM

## 2021-09-20 PROCEDURE — 99204 OFFICE O/P NEW MOD 45 MIN: CPT | Mod: GC

## 2021-10-07 ENCOUNTER — OUTPATIENT (OUTPATIENT)
Dept: OUTPATIENT SERVICES | Facility: HOSPITAL | Age: 51
LOS: 1 days | Discharge: HOME | End: 2021-10-07

## 2021-10-07 DIAGNOSIS — Z90.13 ACQUIRED ABSENCE OF BILATERAL BREASTS AND NIPPLES: Chronic | ICD-10-CM

## 2021-10-07 DIAGNOSIS — C50.912 MALIGNANT NEOPLASM OF UNSPECIFIED SITE OF LEFT FEMALE BREAST: ICD-10-CM

## 2021-10-11 ENCOUNTER — RESULT REVIEW (OUTPATIENT)
Age: 51
End: 2021-10-11

## 2021-10-11 ENCOUNTER — LABORATORY RESULT (OUTPATIENT)
Age: 51
End: 2021-10-11

## 2021-10-11 ENCOUNTER — APPOINTMENT (OUTPATIENT)
Dept: HEMATOLOGY ONCOLOGY | Facility: CLINIC | Age: 51
End: 2021-10-11
Payer: COMMERCIAL

## 2021-10-11 VITALS
RESPIRATION RATE: 16 BRPM | DIASTOLIC BLOOD PRESSURE: 88 MMHG | OXYGEN SATURATION: 98 % | SYSTOLIC BLOOD PRESSURE: 133 MMHG | HEART RATE: 88 BPM | HEIGHT: 65 IN | TEMPERATURE: 98.4 F | BODY MASS INDEX: 29.38 KG/M2 | WEIGHT: 176.37 LBS

## 2021-10-11 LAB
BASOPHILS # BLD AUTO: 0.02 K/UL — SIGNIFICANT CHANGE UP (ref 0–0.2)
BASOPHILS NFR BLD AUTO: 0.5 % — SIGNIFICANT CHANGE UP (ref 0–2)
EOSINOPHIL # BLD AUTO: 0.09 K/UL — SIGNIFICANT CHANGE UP (ref 0–0.5)
EOSINOPHIL NFR BLD AUTO: 2.2 % — SIGNIFICANT CHANGE UP (ref 0–6)
ERYTHROCYTE [SEDIMENTATION RATE] IN BLOOD: 10 MM/HR — SIGNIFICANT CHANGE UP (ref 0–20)
HCT VFR BLD CALC: 35.8 % — SIGNIFICANT CHANGE UP (ref 34.5–45)
HGB BLD-MCNC: 11.5 G/DL — SIGNIFICANT CHANGE UP (ref 11.5–15.5)
IMM GRANULOCYTES NFR BLD AUTO: 0.2 % — SIGNIFICANT CHANGE UP (ref 0–1.5)
LYMPHOCYTES # BLD AUTO: 1.65 K/UL — SIGNIFICANT CHANGE UP (ref 1–3.3)
LYMPHOCYTES # BLD AUTO: 40.3 % — SIGNIFICANT CHANGE UP (ref 13–44)
MCHC RBC-ENTMCNC: 24.6 PG — LOW (ref 27–34)
MCHC RBC-ENTMCNC: 32.1 G/DL — SIGNIFICANT CHANGE UP (ref 32–36)
MCV RBC AUTO: 76.7 FL — LOW (ref 80–100)
MONOCYTES # BLD AUTO: 0.3 K/UL — SIGNIFICANT CHANGE UP (ref 0–0.9)
MONOCYTES NFR BLD AUTO: 7.3 % — SIGNIFICANT CHANGE UP (ref 2–14)
NEUTROPHILS # BLD AUTO: 2.02 K/UL — SIGNIFICANT CHANGE UP (ref 1.8–7.4)
NEUTROPHILS NFR BLD AUTO: 49.5 % — SIGNIFICANT CHANGE UP (ref 43–77)
NRBC # BLD: 0 /100 WBCS — SIGNIFICANT CHANGE UP (ref 0–0)
PLATELET # BLD AUTO: 196 K/UL — SIGNIFICANT CHANGE UP (ref 150–400)
RBC # BLD: 4.67 M/UL — SIGNIFICANT CHANGE UP (ref 3.8–5.2)
RBC # FLD: 14.6 % — HIGH (ref 10.3–14.5)
RETICS #: 61.6 K/UL — SIGNIFICANT CHANGE UP (ref 25–125)
RETICS/RBC NFR: 1.3 % — SIGNIFICANT CHANGE UP (ref 0.5–2.5)
WBC # BLD: 4.09 K/UL — SIGNIFICANT CHANGE UP (ref 3.8–10.5)
WBC # FLD AUTO: 4.09 K/UL — SIGNIFICANT CHANGE UP (ref 3.8–10.5)

## 2021-10-11 PROCEDURE — 99214 OFFICE O/P EST MOD 30 MIN: CPT

## 2021-10-12 LAB
ALBUMIN SERPL ELPH-MCNC: 4 G/DL
ALP BLD-CCNC: 46 U/L
ALT SERPL-CCNC: 12 U/L
ANION GAP SERPL CALC-SCNC: 11 MMOL/L
AST SERPL-CCNC: 18 U/L
BILIRUB SERPL-MCNC: 0.2 MG/DL
BUN SERPL-MCNC: 10 MG/DL
CALCIUM SERPL-MCNC: 9.2 MG/DL
CHLORIDE SERPL-SCNC: 108 MMOL/L
CO2 SERPL-SCNC: 26 MMOL/L
CREAT SERPL-MCNC: 0.73 MG/DL
DEPRECATED KAPPA LC FREE/LAMBDA SER: 1.05 RATIO
FERRITIN SERPL-MCNC: 34 NG/ML
FOLATE SERPL-MCNC: >20 NG/ML
GLUCOSE SERPL-MCNC: 126 MG/DL
IRON SATN MFR SERPL: 24 %
IRON SERPL-MCNC: 75 UG/DL
KAPPA LC CSF-MCNC: 1.92 MG/DL
KAPPA LC SERPL-MCNC: 2.02 MG/DL
POTASSIUM SERPL-SCNC: 3.4 MMOL/L
PROT SERPL-MCNC: 7.2 G/DL
SODIUM SERPL-SCNC: 145 MMOL/L
TIBC SERPL-MCNC: 321 UG/DL
TRANSFERRIN SERPL-MCNC: 262 MG/DL
TSH SERPL-ACNC: 1.19 UIU/ML
UIBC SERPL-MCNC: 246 UG/DL
VIT B12 SERPL-MCNC: 1301 PG/ML

## 2021-10-14 LAB
ALBUMIN MFR SERPL ELPH: 52.2 %
ALBUMIN SERPL-MCNC: 3.8 G/DL
ALBUMIN/GLOB SERPL: 1.1 RATIO
ALPHA1 GLOB MFR SERPL ELPH: 4.3 %
ALPHA1 GLOB SERPL ELPH-MCNC: 0.3 G/DL
ALPHA2 GLOB MFR SERPL ELPH: 7.8 %
ALPHA2 GLOB SERPL ELPH-MCNC: 0.6 G/DL
B-GLOBULIN MFR SERPL ELPH: 13 %
B-GLOBULIN SERPL ELPH-MCNC: 0.9 G/DL
GAMMA GLOB FLD ELPH-MCNC: 1.6 G/DL
GAMMA GLOB MFR SERPL ELPH: 22.7 %
INTERPRETATION SERPL IEP-IMP: NORMAL
M PROTEIN SPEC IFE-MCNC: NORMAL
PROT SERPL-MCNC: 7.2 G/DL
PROT SERPL-MCNC: 7.2 G/DL

## 2021-10-16 NOTE — REASON FOR VISIT
[Follow-Up Visit] : a follow-up [Other: _____] : [unfilled] [FreeTextEntry2] : breast cancer ER/[place] Positive, HER-2 NEGATIVE

## 2021-10-16 NOTE — HISTORY OF PRESENT ILLNESS
[Disease: _____________________] : Disease: [unfilled] [T: ___] : T[unfilled] [N: ___] : N[unfilled] [M: ___] : M[unfilled] [AJCC Stage: ____] : AJCC Stage: [unfilled] [IA] : IA [de-identified] : Ms. Bermeo is a 47 y/o female with stage IA invasive ductal carcinoma of left breast, ER+/NC+/HER2- s/p bilateral mastectomy and adjuvant chemotherapy with TC x4 here for f/u. Her oncologic history is as follows: \par \par She underwent a screening mammography on 10/16/2015 because of a lump she had palpated in the left breast, which revealed two clusters of heterogeneous microcalcifications in the left breast.  This was followed by an ultrasound  which revealed a 0.9 x 1.3 x 0.6cm poorly marginated/microlobulated, heterogeneously hypoechoic nodule in the left breast, directly beneath the region of interest of the palpable lump, 9:00 position 1 cm from the nipple. She underwent a core biopsy of the nodule on 11/4/2015 which revealed DCIS, solid and cribriform patterns, high grade nuclear atypia and focal necrosis. ER 60%, NC 30%. This biopsy was followed by stereotactic core biopsies\par of the two groupings of calcifications in the left breast on 11/6/2015. Biopsy of ''left 9:00 anterior" revealed  DCIS and biopsy of "left 9:00 posterior" revealed grade 2 IDC along with DCIS. ER 95%, NC 95%, HER2 0/1+. ER/NC repeated on DCIS (but unclear if on anterior or posterior block): ER 95%, NC 95%.\par \par Following the biopsies, she had a bilateral breast MRI on 11/19/15.  The left breast revealed a large ~5cm area of nonmass enhancement extending from the upper inner breast, involving the palpable area. Additionally, there was a 5.3cm rim enhancing mass at the superior posterior central left breast likely a combination of post biopsy and hematoma and residual disease. 4 mm focus of enhancement lower outer left breast. The right breast demonstrated a 11 mm heterogeneous focal nonmass enhancement central right breast. \par Bilateral prominent axillary lymph nodes. She underwent biopsies of both axillary LNs on 12/9/15: right LN: fragmented, unremarkable LN. Left LN: unremarkable breast tissue, no lymphoid tissue identified. She also had a MRI guided core biopsy of the right breast on 12/11/15: benign.  \par \par 1/18/16- Bilateral mastectomy (patient preference; genetics neg) and SLNB with ZANDER reconstruction-  \par Right mastectomy: proliferative fibrocystic changes with usual ductal hyperplasia, calcification, biopsy site changes, columnar cell change, sclerosing adenosis and stromal fibrosis, 0/3 nodes\par Left mastectomy: Invasive ductal carcinoma, stage IA ([id number])\par Size: 3 foci, largest 9 mm (others 4mm, 5mm)   stGstrstastdstest:st st1st LNs: Negative (0/3 sentinel LNs, 0/2 internal mammary LNs)  DCIS: Present  Margins: Negative  LVI: absent \par ER 80%, NC 90%, HER2 negative \par Oncotype: 32\par  \par Adjuvant chemotherapy: TC x4 (3/11/16-5/13/16)\par Tamoxifen (8/1/16-)\par \par Her medical history is notable for iron deficiency anemia for which she received IV iron in November 2015.  She had a CT c/a/p, EGD and colonoscopy, all of which were reportedly normal. She states she has had iron deficiency in the past but never to this degree. No clear etiology of iron deficiency so far. 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 Sherice Ellison for evaluation of hypercoagulable state prior to surgery. Hypercoagulable w/u negative but given her history, prophylactic lovenox was recommended for 4 weeks postop.  [de-identified] : Moderately differentiated invasive ductal carcinoma [de-identified] : ER 80%, FL 90%, HER2 negative  [de-identified] : Genetics: CancerNext 32 Gene Panel from [facility]: Negative  [FreeTextEntry1] : Tamoxifen 8/2016- 9/2018; Anastrazole since 9/2018- 1/2019 exemestane 1/2019.  [de-identified] : Ms. BRE GORE is here for a follow up of left breast cancer on endocrine therapy since 8/2016. She tried AI x 2 after menopause but had to stop due to intolerable s/e. \par She is tolerating tamoxifen well. Good compliance. She denies mood changes, wt gain, vaginal dryness, SOB, chest pain, leg swelling or clotting issues. Her energy and appetite is good, wt stable. Mild hot flashes are manageable. She injured her back lifting heavy wt, s/p Xray and PT and improved significantly. \par She is postmenopausal. No PMB. GYN - Dr Hansen. Saw 11/2020. Reminded to call and make appt\par Breast imaging: not needed\par Opthal: She has glasses, seeing next week\par Bone scan  2/2019: negative  for mets \par Dexa scan 5/2019: normal cont ca and vit d \par She has complete 5 yrs of endocrine therapy but we discussed that tamoxifen is typically for 10 yrs. Pt in agreement to continue x 10 yrs\par Noted to have mild anemia, no GI bleeding. rec C scope, cbc repeated today showed improvement. Encouraged GI f/u

## 2021-10-16 NOTE — PHYSICAL EXAM
[Fully active, able to carry on all pre-disease performance without restriction] : Status 0 - Fully active, able to carry on all pre-disease performance without restriction [Normal] : affect appropriate [de-identified] : BL mastectomies with reconstruction, no chest wall or axillary nodules

## 2021-10-16 NOTE — ASSESSMENT
[Curative] : Goals of care discussed with patient: Curative [FreeTextEntry1] : Ms. Bermeo is a 50 y/o female with stage IA ([id number]) moderately differentiated ER+/UT+/HER2- invasive ductal carcinoma of the left breast s/p bilateral mastectomy, SLNB and ZANDER reconstruction 1/18/16, Oncotype 32, completed adjuvant chemotherapy with TCx4, on tamoxifen (8/1/16-9/2018), switched to anastrozole 9/2018, exemestane 1/2019. Suffered arthralgias 2/2 AI. Tamoxifen restarted 8/2019\par \par - Breast ca: She is tolerating tamoxifen very well without significant side effects. Reports good compliance. Plan to continue tamoxifen for 10 years (8/2026). GYN and opthal f/u annually due to risk of endometrial ca and cataracts respectively.  No breast imaging needed. She has complete 5 yrs of endocrine therapy but we discussed that tamoxifen is typically for 10 yrs. Pt in agreement to continue x 10 yrs\par - ANEMIA: Noted to have mild anemia, no GI bleeding. rec C scope, cbc repeated today showed improvement. Encouraged GI f/u. Anemia work up negative\par -  Mild hot flashes: 2/2 tamoxifen.  Advised to wear layers and use fan prn. Consider Effexor if get worse.\par - Concern for Wt gain 2/2 tamoxifen:Life style modifications, healthy diet and exercise d/w her\par - Patient is aware of signs and symptoms of DVT/PE. Patient will report if she develops acute onset chest pain shortness of breath, leg swelling or calf pain. \par - Continue to followup with primary care for HTN control. ASA 81 advised\par - Low Vitamin D: concern for worsening bone loss due to low vit D. Discussed to increase Vit D intake \par \par RTC 6m

## 2021-10-21 LAB
ALBUPE: 20.2 %
ALPHA1UPE: 30.5 %
ALPHA2UPE: 20.2 %
BETAUPE: 17.7 %
CREAT 24H UR-MCNC: NORMAL G/24 H
CREATININE UR (MAYO): 143 MG/DL
GAMMAUPE: 11.4 %
IGA 24H UR QL IFE: NORMAL
KAPPA LC 24H UR QL: NORMAL
PROT PATTERN 24H UR ELPH-IMP: NORMAL
PROT UR-MCNC: 13 MG/DL
PROT UR-MCNC: 13 MG/DL
SPECIMEN VOL 24H UR: NORMAL ML

## 2021-10-25 ENCOUNTER — APPOINTMENT (OUTPATIENT)
Dept: GASTROENTEROLOGY | Facility: CLINIC | Age: 51
End: 2021-10-25

## 2021-10-28 ENCOUNTER — APPOINTMENT (OUTPATIENT)
Dept: GASTROENTEROLOGY | Facility: AMBULATORY MEDICAL SERVICES | Age: 51
End: 2021-10-28
Payer: COMMERCIAL

## 2021-10-28 LAB — SARS-COV-2 N GENE NPH QL NAA+PROBE: NOT DETECTED

## 2021-10-28 PROCEDURE — 45380 COLONOSCOPY AND BIOPSY: CPT | Mod: 33

## 2021-10-28 PROCEDURE — 43239 EGD BIOPSY SINGLE/MULTIPLE: CPT

## 2021-11-05 ENCOUNTER — RESULT REVIEW (OUTPATIENT)
Age: 51
End: 2021-11-05

## 2021-11-18 ENCOUNTER — APPOINTMENT (OUTPATIENT)
Dept: CT IMAGING | Facility: IMAGING CENTER | Age: 51
End: 2021-11-18
Payer: COMMERCIAL

## 2021-11-18 ENCOUNTER — OUTPATIENT (OUTPATIENT)
Dept: OUTPATIENT SERVICES | Facility: HOSPITAL | Age: 51
LOS: 1 days | End: 2021-11-18
Payer: COMMERCIAL

## 2021-11-18 DIAGNOSIS — K63.5 POLYP OF COLON: ICD-10-CM

## 2021-11-18 DIAGNOSIS — Z90.13 ACQUIRED ABSENCE OF BILATERAL BREASTS AND NIPPLES: Chronic | ICD-10-CM

## 2021-11-18 DIAGNOSIS — Z00.8 ENCOUNTER FOR OTHER GENERAL EXAMINATION: ICD-10-CM

## 2021-11-18 PROCEDURE — 74177 CT ABD & PELVIS W/CONTRAST: CPT | Mod: 26

## 2021-11-18 PROCEDURE — 74177 CT ABD & PELVIS W/CONTRAST: CPT

## 2021-11-23 DIAGNOSIS — R10.9 UNSPECIFIED ABDOMINAL PAIN: ICD-10-CM

## 2021-11-30 ENCOUNTER — NON-APPOINTMENT (OUTPATIENT)
Age: 51
End: 2021-11-30

## 2021-12-01 ENCOUNTER — NON-APPOINTMENT (OUTPATIENT)
Age: 51
End: 2021-12-01

## 2021-12-07 ENCOUNTER — APPOINTMENT (OUTPATIENT)
Dept: OBGYN | Facility: CLINIC | Age: 51
End: 2021-12-07
Payer: COMMERCIAL

## 2021-12-07 VITALS — DIASTOLIC BLOOD PRESSURE: 113 MMHG | SYSTOLIC BLOOD PRESSURE: 191 MMHG

## 2021-12-07 VITALS
SYSTOLIC BLOOD PRESSURE: 198 MMHG | DIASTOLIC BLOOD PRESSURE: 117 MMHG | WEIGHT: 173 LBS | HEART RATE: 79 BPM | HEIGHT: 65 IN | BODY MASS INDEX: 28.82 KG/M2

## 2021-12-07 PROCEDURE — 99213 OFFICE O/P EST LOW 20 MIN: CPT

## 2021-12-07 RX ORDER — SODIUM SULFATE, POTASSIUM SULFATE, MAGNESIUM SULFATE 17.5; 3.13; 1.6 G/ML; G/ML; G/ML
17.5-3.13-1.6 SOLUTION, CONCENTRATE ORAL
Qty: 1 | Refills: 0 | Status: COMPLETED | COMMUNITY
Start: 2021-09-15 | End: 2021-12-07

## 2021-12-07 NOTE — DISCUSSION/SUMMARY
[FreeTextEntry1] : A - Right adnexal cystic mass\par \par P-- pelvic sono with Fougner, and f/u visit with me after sono to determine next steps. \par

## 2021-12-07 NOTE — HISTORY OF PRESENT ILLNESS
[FreeTextEntry1] : pt presents as a recommended f/u, following a colonoscopy by Dr. Walter on 10/28/21.  who pt was referred to by Hematologist- Oncologist Kamryn Ellison. a polyp was found on colonoscopy and was biopsied  , but not removed .pathology report of biopsy reads: " cecum polyp"  pt was subsequently sent for a CT of abd/pelvis, for follow up of of polyp seen and biopsied  on colonoscopy . \par on CT scan dated 11/18/21, a 5.2cm right adnexal mass was seen \par recommendation was for further investigation via ultrasound.\par pt has breast cancer s/p bilateral mastectomy and chemotherapy, now on Tamoxifen.  \par

## 2021-12-08 ENCOUNTER — APPOINTMENT (OUTPATIENT)
Dept: GASTROENTEROLOGY | Facility: CLINIC | Age: 51
End: 2021-12-08
Payer: COMMERCIAL

## 2021-12-08 VITALS
SYSTOLIC BLOOD PRESSURE: 163 MMHG | BODY MASS INDEX: 28.82 KG/M2 | HEART RATE: 85 BPM | WEIGHT: 173 LBS | TEMPERATURE: 98.6 F | HEIGHT: 65 IN | OXYGEN SATURATION: 97 % | DIASTOLIC BLOOD PRESSURE: 97 MMHG

## 2021-12-08 DIAGNOSIS — D12.6 BENIGN NEOPLASM OF COLON, UNSPECIFIED: ICD-10-CM

## 2021-12-08 DIAGNOSIS — K63.5 POLYP OF COLON: ICD-10-CM

## 2021-12-08 PROCEDURE — 99213 OFFICE O/P EST LOW 20 MIN: CPT

## 2021-12-08 NOTE — ASSESSMENT
[FreeTextEntry1] : Patient with a ovarian cyst being followed by GYN.  She is scheduled for pelvic sonogram the beginning of next week.\par She has a large cecal polyp and was referred to Dr. Mojica for EMR resection of the large polyp.  Pathology revealed this to be a tubular adenoma.

## 2021-12-08 NOTE — HISTORY OF PRESENT ILLNESS
[FreeTextEntry1] : Patient saw her gynecologist for her ovarian cyst.  She is scheduled for a pelvic sonogram the beginning of next week.\par She had a colonoscopy that revealed a large polyp in the cecum.  Biopsies revealed this to be a tubular adenoma.  She is referred for EMR of the polyp by Dr. Mojica.

## 2021-12-13 ENCOUNTER — APPOINTMENT (OUTPATIENT)
Dept: OBGYN | Facility: CLINIC | Age: 51
End: 2021-12-13
Payer: COMMERCIAL

## 2021-12-13 ENCOUNTER — ASOB RESULT (OUTPATIENT)
Age: 51
End: 2021-12-13

## 2021-12-13 VITALS
WEIGHT: 171 LBS | DIASTOLIC BLOOD PRESSURE: 91 MMHG | BODY MASS INDEX: 28.49 KG/M2 | SYSTOLIC BLOOD PRESSURE: 151 MMHG | HEIGHT: 65 IN | HEART RATE: 83 BPM

## 2021-12-13 DIAGNOSIS — Z01.818 ENCOUNTER FOR OTHER PREPROCEDURAL EXAMINATION: ICD-10-CM

## 2021-12-13 PROCEDURE — 76830 TRANSVAGINAL US NON-OB: CPT

## 2021-12-13 PROCEDURE — 99213 OFFICE O/P EST LOW 20 MIN: CPT

## 2021-12-13 NOTE — HISTORY OF PRESENT ILLNESS
[FreeTextEntry1] : pt presents for follow up, post sono today by EMRE Moreno, for concern of a right adnexal mass seen on prior CT scan. \par Concomitantly , pt has a polypoid mass in the cecum that is being removed on Wednesday ( in 48 hrs).

## 2021-12-13 NOTE — DISCUSSION/SUMMARY
[FreeTextEntry1] : A - Right adnexal mass, complex  with increased vascularity\par       breast cancer\par      Colonic polyp\par \par P- reviewed report and recommendation of Dr. Moreno\par     curbside call to Dr. Mckinney re: tumor markers \par      referral given for GYN Oncology - Dr. Mckinney\par      CA-125, CEA CA 19-9 - done, \par     COVID PCR done for pre-op for Wednesday for colon polyp removal

## 2021-12-14 LAB
CANCER AG125 SERPL-ACNC: 10 U/ML
CANCER AG19-9 SERPL-ACNC: <2 U/ML
CEA SERPL-MCNC: 3.7 NG/ML
SARS-COV-2 N GENE NPH QL NAA+PROBE: NOT DETECTED

## 2021-12-15 ENCOUNTER — RESULT REVIEW (OUTPATIENT)
Age: 51
End: 2021-12-15

## 2021-12-15 ENCOUNTER — OUTPATIENT (OUTPATIENT)
Dept: OUTPATIENT SERVICES | Facility: HOSPITAL | Age: 51
LOS: 1 days | Discharge: HOME | End: 2021-12-15
Payer: COMMERCIAL

## 2021-12-15 ENCOUNTER — APPOINTMENT (OUTPATIENT)
Dept: GASTROENTEROLOGY | Facility: HOSPITAL | Age: 51
End: 2021-12-15

## 2021-12-15 VITALS
OXYGEN SATURATION: 99 % | SYSTOLIC BLOOD PRESSURE: 142 MMHG | TEMPERATURE: 98 F | HEART RATE: 82 BPM | RESPIRATION RATE: 15 BRPM | DIASTOLIC BLOOD PRESSURE: 92 MMHG | HEIGHT: 65 IN | WEIGHT: 173.06 LBS

## 2021-12-15 VITALS
RESPIRATION RATE: 14 BRPM | DIASTOLIC BLOOD PRESSURE: 85 MMHG | HEART RATE: 86 BPM | SYSTOLIC BLOOD PRESSURE: 149 MMHG | OXYGEN SATURATION: 100 %

## 2021-12-15 DIAGNOSIS — Z90.13 ACQUIRED ABSENCE OF BILATERAL BREASTS AND NIPPLES: Chronic | ICD-10-CM

## 2021-12-15 DIAGNOSIS — K63.5 POLYP OF COLON: ICD-10-CM

## 2021-12-15 LAB — HCG UR QL: NEGATIVE — SIGNIFICANT CHANGE UP

## 2021-12-15 PROCEDURE — 88305 TISSUE EXAM BY PATHOLOGIST: CPT | Mod: 26

## 2021-12-15 PROCEDURE — 45385 COLONOSCOPY W/LESION REMOVAL: CPT | Mod: GC

## 2021-12-15 NOTE — ASU PATIENT PROFILE, ADULT - NSICDXPASTMEDICALHX_GEN_ALL_CORE_FT
PAST MEDICAL HISTORY:  Anemia     H. pylori infection     Malignant neoplasm of upper-inner quadrant of left female breast     Sickle cell trait

## 2021-12-15 NOTE — ASU PATIENT PROFILE, ADULT - FALL HARM RISK - UNIVERSAL INTERVENTIONS
Bed in lowest position, wheels locked, appropriate side rails in place/Call bell, personal items and telephone in reach/Instruct patient to call for assistance before getting out of bed or chair/Non-slip footwear when patient is out of bed/[place] to call system/Physically safe environment - no spills, clutter or unnecessary equipment/Purposeful Proactive Rounding/Room/bathroom lighting operational, light cord in reach

## 2021-12-17 LAB — SURGICAL PATHOLOGY STUDY: SIGNIFICANT CHANGE UP

## 2021-12-20 ENCOUNTER — APPOINTMENT (OUTPATIENT)
Dept: DERMATOLOGY | Facility: CLINIC | Age: 51
End: 2021-12-20

## 2021-12-21 ENCOUNTER — APPOINTMENT (OUTPATIENT)
Dept: GYNECOLOGIC ONCOLOGY | Facility: CLINIC | Age: 51
End: 2021-12-21
Payer: COMMERCIAL

## 2021-12-21 ENCOUNTER — NON-APPOINTMENT (OUTPATIENT)
Age: 51
End: 2021-12-21

## 2021-12-21 VITALS
HEART RATE: 81 BPM | RESPIRATION RATE: 16 BRPM | SYSTOLIC BLOOD PRESSURE: 152 MMHG | HEIGHT: 65 IN | OXYGEN SATURATION: 97 % | DIASTOLIC BLOOD PRESSURE: 96 MMHG | WEIGHT: 171 LBS | BODY MASS INDEX: 28.49 KG/M2

## 2021-12-21 DIAGNOSIS — N83.299 OTHER OVARIAN CYST, UNSPECIFIED SIDE: ICD-10-CM

## 2021-12-21 PROCEDURE — 99204 OFFICE O/P NEW MOD 45 MIN: CPT

## 2022-01-14 ENCOUNTER — OUTPATIENT (OUTPATIENT)
Dept: OUTPATIENT SERVICES | Facility: HOSPITAL | Age: 52
LOS: 1 days | End: 2022-01-14
Payer: COMMERCIAL

## 2022-01-14 ENCOUNTER — APPOINTMENT (OUTPATIENT)
Dept: ULTRASOUND IMAGING | Facility: IMAGING CENTER | Age: 52
End: 2022-01-14
Payer: COMMERCIAL

## 2022-01-14 DIAGNOSIS — Z00.8 ENCOUNTER FOR OTHER GENERAL EXAMINATION: ICD-10-CM

## 2022-01-14 DIAGNOSIS — N83.291 OTHER OVARIAN CYST, RIGHT SIDE: ICD-10-CM

## 2022-01-14 DIAGNOSIS — Z90.13 ACQUIRED ABSENCE OF BILATERAL BREASTS AND NIPPLES: Chronic | ICD-10-CM

## 2022-01-14 PROCEDURE — 76830 TRANSVAGINAL US NON-OB: CPT

## 2022-01-14 PROCEDURE — 76830 TRANSVAGINAL US NON-OB: CPT | Mod: 26

## 2022-01-14 PROCEDURE — 76856 US EXAM PELVIC COMPLETE: CPT | Mod: 26,59

## 2022-01-14 PROCEDURE — 76856 US EXAM PELVIC COMPLETE: CPT

## 2022-01-17 ENCOUNTER — OUTPATIENT (OUTPATIENT)
Dept: OUTPATIENT SERVICES | Facility: HOSPITAL | Age: 52
LOS: 1 days | Discharge: HOME | End: 2022-01-17

## 2022-01-17 DIAGNOSIS — C50.912 MALIGNANT NEOPLASM OF UNSPECIFIED SITE OF LEFT FEMALE BREAST: ICD-10-CM

## 2022-01-17 DIAGNOSIS — Z90.13 ACQUIRED ABSENCE OF BILATERAL BREASTS AND NIPPLES: Chronic | ICD-10-CM

## 2022-01-19 ENCOUNTER — APPOINTMENT (OUTPATIENT)
Dept: HEMATOLOGY ONCOLOGY | Facility: CLINIC | Age: 52
End: 2022-01-19
Payer: COMMERCIAL

## 2022-01-19 ENCOUNTER — APPOINTMENT (OUTPATIENT)
Dept: GASTROENTEROLOGY | Facility: CLINIC | Age: 52
End: 2022-01-19

## 2022-01-19 VITALS
BODY MASS INDEX: 27.92 KG/M2 | WEIGHT: 167.55 LBS | HEART RATE: 77 BPM | SYSTOLIC BLOOD PRESSURE: 125 MMHG | DIASTOLIC BLOOD PRESSURE: 87 MMHG | HEIGHT: 64.96 IN | OXYGEN SATURATION: 97 % | RESPIRATION RATE: 17 BRPM | TEMPERATURE: 97.9 F

## 2022-01-19 DIAGNOSIS — D50.9 IRON DEFICIENCY ANEMIA, UNSPECIFIED: ICD-10-CM

## 2022-01-19 PROCEDURE — 99214 OFFICE O/P EST MOD 30 MIN: CPT

## 2022-01-19 NOTE — PHYSICAL EXAM
[Fully active, able to carry on all pre-disease performance without restriction] : Status 0 - Fully active, able to carry on all pre-disease performance without restriction [Normal] : affect appropriate [de-identified] : BL mastectomies with reconstruction, no chest wall or axillary nodules

## 2022-01-19 NOTE — REASON FOR VISIT
[Follow-Up Visit] : a follow-up [Other: _____] : [unfilled] [FreeTextEntry2] : breast cancer ER/SC Positive, HER-2 NEGATIVE

## 2022-01-19 NOTE — HISTORY OF PRESENT ILLNESS
[Disease: _____________________] : Disease: [unfilled] [T: ___] : T[unfilled] [N: ___] : N[unfilled] [M: ___] : M[unfilled] [AJCC Stage: ____] : AJCC Stage: [unfilled] [IA] : IA [de-identified] : Ms. Bermeo is a 47 y/o female with stage IA invasive ductal carcinoma of left breast, ER+/OK+/HER2- s/p bilateral mastectomy and adjuvant chemotherapy with TC x4 here for f/u. Her oncologic history is as follows: \par \par She underwent a screening mammography on 10/16/2015 because of a lump she had palpated in the left breast, which revealed two clusters of heterogeneous microcalcifications in the left breast.  This was followed by an ultrasound  which revealed a 0.9 x 1.3 x 0.6cm poorly marginated/microlobulated, heterogeneously hypoechoic nodule in the left breast, directly beneath the region of interest of the palpable lump, 9:00 position 1 cm from the nipple. She underwent a core biopsy of the nodule on 11/4/2015 which revealed DCIS, solid and cribriform patterns, high grade nuclear atypia and focal necrosis. ER 60%, OK 30%. This biopsy was followed by stereotactic core biopsies\par of the two groupings of calcifications in the left breast on 11/6/2015. Biopsy of ''left 9:00 anterior" revealed  DCIS and biopsy of "left 9:00 posterior" revealed grade 2 IDC along with DCIS. ER 95%, OK 95%, HER2 0/1+. ER/OK repeated on DCIS (but unclear if on anterior or posterior block): ER 95%, OK 95%.\par \par Following the biopsies, she had a bilateral breast MRI on 11/19/15.  The left breast revealed a large ~5cm area of nonmass enhancement extending from the upper inner breast, involving the palpable area. Additionally, there was a 5.3cm rim enhancing mass at the superior posterior central left breast likely a combination of post biopsy and hematoma and residual disease. 4 mm focus of enhancement lower outer left breast. The right breast demonstrated a 11 mm heterogeneous focal nonmass enhancement central right breast. \par Bilateral prominent axillary lymph nodes. She underwent biopsies of both axillary LNs on 12/9/15: right LN: fragmented, unremarkable LN. Left LN: unremarkable breast tissue, no lymphoid tissue identified. She also had a MRI guided core biopsy of the right breast on 12/11/15: benign.  \par \par 1/18/16- Bilateral mastectomy (patient preference; genetics neg) and SLNB with ZANDER reconstruction-  \par Right mastectomy: proliferative fibrocystic changes with usual ductal hyperplasia, calcification, biopsy site changes, columnar cell change, sclerosing adenosis and stromal fibrosis, 0/3 nodes\par Left mastectomy: Invasive ductal carcinoma, stage IA ([id number])\par Size: 3 foci, largest 9 mm (others 4mm, 5mm)   stGstrstastdstest:st st1st LNs: Negative (0/3 sentinel LNs, 0/2 internal mammary LNs)  DCIS: Present  Margins: Negative  LVI: absent \par ER 80%, OK 90%, HER2 negative \par Oncotype: 32\par  \par Adjuvant chemotherapy: TC x4 (3/11/16-5/13/16)\par Tamoxifen (8/1/16-)\par \par Her medical history is notable for iron deficiency anemia for which she received IV iron in November 2015.  She had a CT c/a/p, EGD and colonoscopy, all of which were reportedly normal. She states she has had iron deficiency in the past but never to this degree. No clear etiology of iron deficiency so far. 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 Sherice Ellison for evaluation of hypercoagulable state prior to surgery. Hypercoagulable w/u negative but given her history, prophylactic lovenox was recommended for 4 weeks postop.  [de-identified] : Moderately differentiated invasive ductal carcinoma [de-identified] : ER 80%, ID 90%, HER2 negative  [de-identified] : Genetics: CancerNext 32 Gene Panel from [facility]: Negative  [FreeTextEntry1] : Tamoxifen 8/2016- 9/2018; Anastrazole since 9/2018- 1/2019 exemestane 1/2019.  [de-identified] : Ms. BRE GORE is here for a follow up of left breast cancer on endocrine therapy since 8/2016. She tried AI x 2 after menopause but had to stop due to intolerable s/e. \par She is tolerating tamoxifen well. Good compliance. She denies mood changes, wt gain, vaginal dryness, SOB, chest pain, leg swelling or clotting issues. Her energy and appetite is good, wt stable. Mild hot flashes are manageable. She injured her back lifting heavy wt, s/p Xray and PT and improved significantly. \par She is postmenopausal. No PMB. GYN - Dr Hansen. Saw 12/2021\par colonoscopy 12/2021- tubular adenoma, cecal \par CT 11/2021: 54 cm adnexal mass, f/u pelvis sono 1/2022: simple cyst, normal endometrial lining \par If Dr Cortez is concerned,would recommend PET scan. May need surgery as ovary is enlarged\par Breast imaging: not needed\par Opthal: She has glasses, seeing annual\par Bone scan  2/2019: negative  for mets \par Dexa scan 5/2019: normal cont ca and vit d \par She has complete 5 yrs of endocrine therapy but we discussed that tamoxifen is typically for 10 yrs. Pt in agreement to continue x 10 yrs\par Noted to have mild anemia, no GI bleeding. rec C scope, cbc repeated today showed improvement. Encouraged GI f/u

## 2022-01-19 NOTE — ASSESSMENT
[Curative] : Goals of care discussed with patient: Curative [FreeTextEntry1] : Ms. Bermeo is a 50 y/o female with stage IA ([id number]) moderately differentiated ER+/[place]+/HER2- invasive ductal carcinoma of the left breast s/p bilateral mastectomy, SLNB and ZANDER reconstruction 1/18/16, Oncotype 32, completed adjuvant chemotherapy with TCx4, on tamoxifen (8/1/16-9/2018), switched to anastrozole 9/2018, exemestane 1/2019. Suffered arthralgias 2/2 AI. Tamoxifen restarted 8/2019\par \par - Breast ca: She is tolerating tamoxifen very well without significant side effects. Reports good compliance. Plan to continue tamoxifen for 10 years (8/2026). GYN and opthal f/u annually due to risk of endometrial ca and cataracts respectively.  No breast imaging needed. She has complete 5 yrs of endocrine therapy but we discussed that tamoxifen is typically for 10 yrs. Pt in agreement to continue x 10 yrs\par - ANEMIA: Noted to have mild anemia, no GI bleeding. colonoscopy 12/2021- tubular adenoma, cecal. cbc repeated today showed improvement. Encouraged GI f/u. Anemia work up negative\par - enlarged ovary/adnexal mass: She is postmenopausal. No PMB. GYN - Dr Hansen. Saw 12/2021\par CT 11/2021: 54 cm adnexal mass, f/u pelvis sono 1/2022: simple cyst, normal endometrial lining \par If Dr Cortez is concerned,would recommend PET scan. May need surgery as ovary is enlarged\par -  Mild hot flashes: 2/2 tamoxifen.  Advised to wear layers and use fan prn. Consider Effexor if get worse.\par - Concern for Wt gain 2/2 tamoxifen:Life style modifications, healthy diet and exercise d/w her\par - Patient is aware of signs and symptoms of DVT/PE. Patient will report if she develops acute onset chest pain shortness of breath, leg swelling or calf pain. \par - Continue to followup with primary care for HTN control. ASA 81 advised\par - Low Vitamin D: concern for worsening bone loss due to low vit D. Discussed to increase Vit D intake \par \par RTC 6m

## 2022-01-26 ENCOUNTER — APPOINTMENT (OUTPATIENT)
Dept: GYNECOLOGIC ONCOLOGY | Facility: CLINIC | Age: 52
End: 2022-01-26
Payer: COMMERCIAL

## 2022-01-26 VITALS
BODY MASS INDEX: 28.82 KG/M2 | SYSTOLIC BLOOD PRESSURE: 136 MMHG | HEIGHT: 65 IN | DIASTOLIC BLOOD PRESSURE: 64 MMHG | WEIGHT: 173 LBS

## 2022-01-26 PROCEDURE — 99214 OFFICE O/P EST MOD 30 MIN: CPT

## 2022-02-04 ENCOUNTER — OUTPATIENT (OUTPATIENT)
Dept: OUTPATIENT SERVICES | Facility: HOSPITAL | Age: 52
LOS: 1 days | End: 2022-02-04
Payer: COMMERCIAL

## 2022-02-04 VITALS
RESPIRATION RATE: 16 BRPM | SYSTOLIC BLOOD PRESSURE: 134 MMHG | HEIGHT: 65.5 IN | WEIGHT: 167.99 LBS | DIASTOLIC BLOOD PRESSURE: 92 MMHG | HEART RATE: 80 BPM | TEMPERATURE: 98 F

## 2022-02-04 DIAGNOSIS — Z01.818 ENCOUNTER FOR OTHER PREPROCEDURAL EXAMINATION: ICD-10-CM

## 2022-02-04 DIAGNOSIS — N83.201 UNSPECIFIED OVARIAN CYST, RIGHT SIDE: ICD-10-CM

## 2022-02-04 DIAGNOSIS — Z90.13 ACQUIRED ABSENCE OF BILATERAL BREASTS AND NIPPLES: Chronic | ICD-10-CM

## 2022-02-04 LAB
A1C WITH ESTIMATED AVERAGE GLUCOSE RESULT: 5.9 % — HIGH (ref 4–5.6)
ALBUMIN SERPL ELPH-MCNC: 3.4 G/DL — SIGNIFICANT CHANGE UP (ref 3.3–5)
ANION GAP SERPL CALC-SCNC: 3 MMOL/L — LOW (ref 5–17)
APTT BLD: 33.7 SEC — SIGNIFICANT CHANGE UP (ref 27.5–35.5)
BASOPHILS # BLD AUTO: 0.02 K/UL — SIGNIFICANT CHANGE UP (ref 0–0.2)
BASOPHILS NFR BLD AUTO: 0.4 % — SIGNIFICANT CHANGE UP (ref 0–2)
BUN SERPL-MCNC: 10 MG/DL — SIGNIFICANT CHANGE UP (ref 7–23)
CALCIUM SERPL-MCNC: 9.2 MG/DL — SIGNIFICANT CHANGE UP (ref 8.5–10.1)
CANCER AG125 SERPL-ACNC: 11 U/ML — SIGNIFICANT CHANGE UP
CHLORIDE SERPL-SCNC: 111 MMOL/L — HIGH (ref 96–108)
CO2 SERPL-SCNC: 26 MMOL/L — SIGNIFICANT CHANGE UP (ref 22–31)
CREAT SERPL-MCNC: 0.66 MG/DL — SIGNIFICANT CHANGE UP (ref 0.5–1.3)
EOSINOPHIL # BLD AUTO: 0.09 K/UL — SIGNIFICANT CHANGE UP (ref 0–0.5)
EOSINOPHIL NFR BLD AUTO: 1.9 % — SIGNIFICANT CHANGE UP (ref 0–6)
ESTIMATED AVERAGE GLUCOSE: 123 MG/DL — HIGH (ref 68–114)
GLUCOSE SERPL-MCNC: 92 MG/DL — SIGNIFICANT CHANGE UP (ref 70–99)
HCT VFR BLD CALC: 36.1 % — SIGNIFICANT CHANGE UP (ref 34.5–45)
HGB BLD-MCNC: 11.7 G/DL — SIGNIFICANT CHANGE UP (ref 11.5–15.5)
IMM GRANULOCYTES NFR BLD AUTO: 0.2 % — SIGNIFICANT CHANGE UP (ref 0–1.5)
INR BLD: 1.1 RATIO — SIGNIFICANT CHANGE UP (ref 0.88–1.16)
LYMPHOCYTES # BLD AUTO: 1.92 K/UL — SIGNIFICANT CHANGE UP (ref 1–3.3)
LYMPHOCYTES # BLD AUTO: 41.1 % — SIGNIFICANT CHANGE UP (ref 13–44)
MCHC RBC-ENTMCNC: 24.4 PG — LOW (ref 27–34)
MCHC RBC-ENTMCNC: 32.4 GM/DL — SIGNIFICANT CHANGE UP (ref 32–36)
MCV RBC AUTO: 75.2 FL — LOW (ref 80–100)
MONOCYTES # BLD AUTO: 0.33 K/UL — SIGNIFICANT CHANGE UP (ref 0–0.9)
MONOCYTES NFR BLD AUTO: 7.1 % — SIGNIFICANT CHANGE UP (ref 2–14)
NEUTROPHILS # BLD AUTO: 2.3 K/UL — SIGNIFICANT CHANGE UP (ref 1.8–7.4)
NEUTROPHILS NFR BLD AUTO: 49.3 % — SIGNIFICANT CHANGE UP (ref 43–77)
PLATELET # BLD AUTO: 251 K/UL — SIGNIFICANT CHANGE UP (ref 150–400)
POTASSIUM SERPL-MCNC: 3.8 MMOL/L — SIGNIFICANT CHANGE UP (ref 3.5–5.3)
POTASSIUM SERPL-SCNC: 3.8 MMOL/L — SIGNIFICANT CHANGE UP (ref 3.5–5.3)
PROTHROM AB SERPL-ACNC: 12.8 SEC — SIGNIFICANT CHANGE UP (ref 10.6–13.6)
RBC # BLD: 4.8 M/UL — SIGNIFICANT CHANGE UP (ref 3.8–5.2)
RBC # FLD: 13.9 % — SIGNIFICANT CHANGE UP (ref 10.3–14.5)
SODIUM SERPL-SCNC: 140 MMOL/L — SIGNIFICANT CHANGE UP (ref 135–145)
WBC # BLD: 4.67 K/UL — SIGNIFICANT CHANGE UP (ref 3.8–10.5)
WBC # FLD AUTO: 4.67 K/UL — SIGNIFICANT CHANGE UP (ref 3.8–10.5)

## 2022-02-04 PROCEDURE — 93010 ELECTROCARDIOGRAM REPORT: CPT

## 2022-02-04 PROCEDURE — 85025 COMPLETE CBC W/AUTO DIFF WBC: CPT

## 2022-02-04 PROCEDURE — 36415 COLL VENOUS BLD VENIPUNCTURE: CPT

## 2022-02-04 PROCEDURE — 86850 RBC ANTIBODY SCREEN: CPT

## 2022-02-04 PROCEDURE — 84100 ASSAY OF PHOSPHORUS: CPT

## 2022-02-04 PROCEDURE — 83036 HEMOGLOBIN GLYCOSYLATED A1C: CPT

## 2022-02-04 PROCEDURE — 85610 PROTHROMBIN TIME: CPT

## 2022-02-04 PROCEDURE — 82040 ASSAY OF SERUM ALBUMIN: CPT

## 2022-02-04 PROCEDURE — 80048 BASIC METABOLIC PNL TOTAL CA: CPT

## 2022-02-04 PROCEDURE — 86901 BLOOD TYPING SEROLOGIC RH(D): CPT

## 2022-02-04 PROCEDURE — 85730 THROMBOPLASTIN TIME PARTIAL: CPT

## 2022-02-04 PROCEDURE — 83735 ASSAY OF MAGNESIUM: CPT

## 2022-02-04 PROCEDURE — 86900 BLOOD TYPING SEROLOGIC ABO: CPT

## 2022-02-04 PROCEDURE — 86304 IMMUNOASSAY TUMOR CA 125: CPT

## 2022-02-04 PROCEDURE — 93005 ELECTROCARDIOGRAM TRACING: CPT

## 2022-02-04 PROCEDURE — G0463: CPT | Mod: 25

## 2022-02-04 NOTE — H&P PST ADULT - NSANTHOSAYNRD_GEN_A_CORE
No. FLAVIA screening performed.  STOP BANG Legend: 0-2 = LOW Risk; 3-4 = INTERMEDIATE Risk; 5-8 = HIGH Risk

## 2022-02-04 NOTE — H&P PST ADULT - HISTORY OF PRESENT ILLNESS
50 y/o yo female with h/o left breast cancer s/p b/l mastectomies with ZANDER on 1/18/16  presents to PST for scheduled laparoscopic salpingo-oophorectomy possible hysterectomy /staging 2/8/22. Patient reports right pelvic pain seen by gyn.

## 2022-02-04 NOTE — H&P PST ADULT - NSANTHGENDERRD_ENT_A_CORE
Detail Level: Zone Render In Strict Bullet Format?: No Continue Regimen: Clindamycin 1% lotion bid No

## 2022-02-04 NOTE — H&P PST ADULT - PROBLEM SELECTOR PLAN 1
Pre op and chlorhexidine instructions given and explained.  Avoid NSAIDs and OTC supplements.   Patient verbalized understanding  continue on the DOS amlodipine   medical consult requested by surgeon completed awaiting report   covid 19 swab scheduled 2/5/22 , advised to quarantine after test

## 2022-02-04 NOTE — H&P PST ADULT - ATTENDING COMMENTS
Patient seen in presurgical holding area with her mother present for the informed consent discussion.  R/B/A were reviewed & understood; consent is signed & witnessed in the chart.

## 2022-02-04 NOTE — H&P PST ADULT - EYES
Notified Honey Pedraza MD at 0609 regarding infant's critical blood glucose result of 252 after morning heel stick.     Spoke with: Honey Pedraza MD    Orders: Perform POC Whole Blood Glucose.            detailed exam EOMI/conjunctiva clear

## 2022-02-04 NOTE — H&P PST ADULT - NSICDXFAMILYHX_GEN_ALL_CORE_FT
FAMILY HISTORY:  Mother  Still living? Yes, Estimated age: 51-60  Family history of hypertension, Age at diagnosis: Age Unknown  Type 2 diabetes mellitus, Age at diagnosis: Age Unknown    Sibling  Still living? Unknown  Family history of sickle cell trait, Age at diagnosis: Age Unknown

## 2022-02-04 NOTE — H&P PST ADULT - HEALTH CARE MAINTENANCE
no recent travels outside of the country or states within the last 14 days, no fever, URI, SOB or recent change /loss in smell or taste   flu vaccine up to date

## 2022-02-04 NOTE — H&P PST ADULT - NSICDXPASTMEDICALHX_GEN_ALL_CORE_FT
PAST MEDICAL HISTORY:  Anemia     H. pylori infection     Malignant neoplasm of upper-inner quadrant of left female breast     Right ovarian cyst     Sickle cell trait

## 2022-02-04 NOTE — H&P PST ADULT - ASSESSMENT
52 y/o yo female with h/o left breast cancer s/p b/l mastectomies with ZANDER on 1/18/16  presents to PST for scheduled laparoscopic salpingo-oophorectomy possible hysterectomy /staging 2/8/22.

## 2022-02-05 DIAGNOSIS — Z01.818 ENCOUNTER FOR OTHER PREPROCEDURAL EXAMINATION: ICD-10-CM

## 2022-02-05 DIAGNOSIS — N83.201 UNSPECIFIED OVARIAN CYST, RIGHT SIDE: ICD-10-CM

## 2022-02-07 RX ORDER — ONDANSETRON 8 MG/1
4 TABLET, FILM COATED ORAL ONCE
Refills: 0 | Status: DISCONTINUED | OUTPATIENT
Start: 2022-02-08 | End: 2022-02-08

## 2022-02-07 RX ORDER — OXYCODONE HYDROCHLORIDE 5 MG/1
10 TABLET ORAL ONCE
Refills: 0 | Status: DISCONTINUED | OUTPATIENT
Start: 2022-02-08 | End: 2022-02-08

## 2022-02-07 RX ORDER — SODIUM CHLORIDE 9 MG/ML
1000 INJECTION, SOLUTION INTRAVENOUS
Refills: 0 | Status: DISCONTINUED | OUTPATIENT
Start: 2022-02-08 | End: 2022-02-08

## 2022-02-07 RX ORDER — MEPERIDINE HYDROCHLORIDE 50 MG/ML
12.5 INJECTION INTRAMUSCULAR; INTRAVENOUS; SUBCUTANEOUS
Refills: 0 | Status: DISCONTINUED | OUTPATIENT
Start: 2022-02-08 | End: 2022-02-08

## 2022-02-07 RX ORDER — FENTANYL CITRATE 50 UG/ML
50 INJECTION INTRAVENOUS
Refills: 0 | Status: DISCONTINUED | OUTPATIENT
Start: 2022-02-08 | End: 2022-02-08

## 2022-02-07 RX ORDER — HYDROMORPHONE HYDROCHLORIDE 2 MG/ML
0.5 INJECTION INTRAMUSCULAR; INTRAVENOUS; SUBCUTANEOUS
Refills: 0 | Status: DISCONTINUED | OUTPATIENT
Start: 2022-02-08 | End: 2022-02-08

## 2022-02-08 ENCOUNTER — OUTPATIENT (OUTPATIENT)
Dept: INPATIENT UNIT | Facility: HOSPITAL | Age: 52
LOS: 1 days | Discharge: HOME | End: 2022-02-08
Payer: COMMERCIAL

## 2022-02-08 ENCOUNTER — RESULT REVIEW (OUTPATIENT)
Age: 52
End: 2022-02-08

## 2022-02-08 VITALS
DIASTOLIC BLOOD PRESSURE: 77 MMHG | HEART RATE: 79 BPM | SYSTOLIC BLOOD PRESSURE: 125 MMHG | RESPIRATION RATE: 14 BRPM | OXYGEN SATURATION: 97 %

## 2022-02-08 VITALS
HEIGHT: 65 IN | HEART RATE: 70 BPM | OXYGEN SATURATION: 100 % | SYSTOLIC BLOOD PRESSURE: 124 MMHG | TEMPERATURE: 97 F | RESPIRATION RATE: 15 BRPM | DIASTOLIC BLOOD PRESSURE: 82 MMHG | WEIGHT: 173.06 LBS

## 2022-02-08 DIAGNOSIS — D50.9 IRON DEFICIENCY ANEMIA, UNSPECIFIED: ICD-10-CM

## 2022-02-08 DIAGNOSIS — Z85.3 PERSONAL HISTORY OF MALIGNANT NEOPLASM OF BREAST: ICD-10-CM

## 2022-02-08 DIAGNOSIS — D57.1 SICKLE-CELL DISEASE WITHOUT CRISIS: ICD-10-CM

## 2022-02-08 DIAGNOSIS — I10 ESSENTIAL (PRIMARY) HYPERTENSION: ICD-10-CM

## 2022-02-08 DIAGNOSIS — N83.9 NONINFLAMMATORY DISORDER OF OVARY, FALLOPIAN TUBE AND BROAD LIGAMENT, UNSPECIFIED: ICD-10-CM

## 2022-02-08 DIAGNOSIS — E55.9 VITAMIN D DEFICIENCY, UNSPECIFIED: ICD-10-CM

## 2022-02-08 DIAGNOSIS — N85.8 OTHER SPECIFIED NONINFLAMMATORY DISORDERS OF UTERUS: ICD-10-CM

## 2022-02-08 DIAGNOSIS — N94.89 OTHER SPECIFIED CONDITIONS ASSOCIATED WITH FEMALE GENITAL ORGANS AND MENSTRUAL CYCLE: ICD-10-CM

## 2022-02-08 DIAGNOSIS — R10.9 UNSPECIFIED ABDOMINAL PAIN: ICD-10-CM

## 2022-02-08 DIAGNOSIS — N83.201 UNSPECIFIED OVARIAN CYST, RIGHT SIDE: ICD-10-CM

## 2022-02-08 DIAGNOSIS — Z90.13 ACQUIRED ABSENCE OF BILATERAL BREASTS AND NIPPLES: Chronic | ICD-10-CM

## 2022-02-08 PROCEDURE — 58661 LAPAROSCOPY REMOVE ADNEXA: CPT | Mod: 80,50

## 2022-02-08 PROCEDURE — 82962 GLUCOSE BLOOD TEST: CPT

## 2022-02-08 PROCEDURE — 88331 PATH CONSLTJ SURG 1 BLK 1SPC: CPT | Mod: 26

## 2022-02-08 PROCEDURE — 88307 TISSUE EXAM BY PATHOLOGIST: CPT | Mod: 26

## 2022-02-08 PROCEDURE — 58661 LAPAROSCOPY REMOVE ADNEXA: CPT | Mod: 50

## 2022-02-08 PROCEDURE — 88305 TISSUE EXAM BY PATHOLOGIST: CPT | Mod: 26

## 2022-02-08 PROCEDURE — 88307 TISSUE EXAM BY PATHOLOGIST: CPT

## 2022-02-08 PROCEDURE — C9399: CPT

## 2022-02-08 PROCEDURE — 88104 CYTOPATH FL NONGYN SMEARS: CPT | Mod: 26

## 2022-02-08 PROCEDURE — 88331 PATH CONSLTJ SURG 1 BLK 1SPC: CPT

## 2022-02-08 PROCEDURE — 88305 TISSUE EXAM BY PATHOLOGIST: CPT

## 2022-02-08 PROCEDURE — 88104 CYTOPATH FL NONGYN SMEARS: CPT

## 2022-02-08 PROCEDURE — 58558 HYSTEROSCOPY BIOPSY: CPT

## 2022-02-08 RX ORDER — SODIUM CHLORIDE 9 MG/ML
3 INJECTION INTRAMUSCULAR; INTRAVENOUS; SUBCUTANEOUS EVERY 8 HOURS
Refills: 0 | Status: DISCONTINUED | OUTPATIENT
Start: 2022-02-08 | End: 2022-02-08

## 2022-02-08 RX ORDER — TAMOXIFEN CITRATE 20 MG/1
1 TABLET, FILM COATED ORAL
Qty: 0 | Refills: 0 | DISCHARGE

## 2022-02-08 RX ORDER — FAMOTIDINE 10 MG/ML
20 INJECTION INTRAVENOUS ONCE
Refills: 0 | Status: COMPLETED | OUTPATIENT
Start: 2022-02-08 | End: 2022-02-08

## 2022-02-08 RX ORDER — METOCLOPRAMIDE HCL 10 MG
10 TABLET ORAL ONCE
Refills: 0 | Status: COMPLETED | OUTPATIENT
Start: 2022-02-08 | End: 2022-02-08

## 2022-02-08 RX ADMIN — SODIUM CHLORIDE 75 MILLILITER(S): 9 INJECTION, SOLUTION INTRAVENOUS at 13:58

## 2022-02-08 RX ADMIN — FAMOTIDINE 20 MILLIGRAM(S): 10 INJECTION INTRAVENOUS at 07:54

## 2022-02-08 RX ADMIN — Medication 10 MILLIGRAM(S): at 07:54

## 2022-02-08 NOTE — ASU PATIENT PROFILE, ADULT - FALL HARM RISK - UNIVERSAL INTERVENTIONS
Bed in lowest position, wheels locked, appropriate side rails in place/Call bell, personal items and telephone in reach/Instruct patient to call for assistance before getting out of bed or chair/Non-slip footwear when patient is out of bed/[place] to call system/Physically safe environment - no spills, clutter or unnecessary equipment/Purposeful Proactive Rounding/Room/bathroom lighting operational, light cord in reach

## 2022-02-08 NOTE — ASSESSMENT
[FreeTextEntry1] : 50 yo female with R adnexal mass and history of breast cancer, currently on Tamoxifen. Discussed with patient options including observation with repeat US vs surgical intervention with laparoscopic RSO possible LSO possible staging. We discussed her ER positive for which oophorectomy may decrease risk of recurrence. Patient will follow up in 8 weeks to review ultrasound and further treatment plan.

## 2022-02-08 NOTE — BRIEF OPERATIVE NOTE - SPECIMENS
Pelvic ascites for cytology; Right fallopian tube and ovary for frozen section; Left fallopian tube and ovary; Infra-colic omental biopsy

## 2022-02-08 NOTE — ASU DISCHARGE PLAN (ADULT/PEDIATRIC) - ASU DC SPECIAL INSTRUCTIONSFT
Follow-up with Dr. Mckinney in two-three weeks to review pathology report.    Please contact your provider for any pain uncontrolled by medication, excessive bleeding or Fever>100.4  Please take aleve-1 tablet every 12 hours x 2 weeks.    May walk and climb stairs as often as youd like, no vigorous activity, do not lift anything greater than 10lbs, nothing per vagina x 2-4 weeks

## 2022-02-08 NOTE — BRIEF OPERATIVE NOTE - NSICDXBRIEFPROCEDURE_GEN_ALL_CORE_FT
PROCEDURES:  Laparoscopic bilateral salpingo-oophorectomy 08-Feb-2022 12:45:35  Fidelia Carvalho  Omental biopsy 08-Feb-2022 12:45:43  Fidelia Carvalho  Diagnostic hysteroscopy with dilation and curettage of uterus if indicated 08-Feb-2022 12:47:25  Fidelia Carvalho

## 2022-02-08 NOTE — ASU DISCHARGE PLAN (ADULT/PEDIATRIC) - CARE PROVIDER_API CALL
Carline Mckinney)  Gynecologic Oncology; Obstetrics and Gynecology  [address] [place] [address]  Phone: [phone]  Fax: [phone]  Follow Up Time:

## 2022-02-08 NOTE — CHIEF COMPLAINT
[FreeTextEntry1] : [facility]\par [place] [facility]\par [address] [facility] [address] [facility] [facility]\[place] [address]\par \par R adnexal mass

## 2022-02-08 NOTE — ASU DISCHARGE PLAN (ADULT/PEDIATRIC) - PROCEDURE
Laparoscopic BSO FS, infra-colic omental biopsy Laparoscopic BSO FS, infra-colic omental biopsy, Hysteroscopy, dilation & curettage

## 2022-02-08 NOTE — ASU PATIENT PROFILE, ADULT - BRAND OF COVID-19 VACCINATION
[FreeTextEntry1] : for shoulder surgery\par good functional capacity\par no h/o bleeding tendencies\par no h/o anesthesia reaction Moderna dose 1, 2, and 3

## 2022-02-08 NOTE — BRIEF OPERATIVE NOTE - NSICDXBRIEFPREOP_GEN_ALL_CORE_FT
PRE-OP DIAGNOSIS:  Right ovarian cyst 08-Feb-2022 12:48:13  Fidelia Carvalho  Personal history of breast cancer 08-Feb-2022 12:48:59  Fidelia Carvalho

## 2022-02-08 NOTE — END OF VISIT
[FreeTextEntry3] : Written by Gloria Burton PA-C, acting as a scribe for Dr. Carline Mckinney.\par This note accurately reflects the work and decisions made by me.\par

## 2022-02-08 NOTE — BRIEF OPERATIVE NOTE - NSICDXBRIEFPOSTOP_GEN_ALL_CORE_FT
POST-OP DIAGNOSIS:  Right ovarian cyst 08-Feb-2022 12:49:07  Fidelia Carvalho  Personal history of breast cancer 08-Feb-2022 12:49:12  Fidelia Carvalho

## 2022-02-08 NOTE — PHYSICAL EXAM
[Chaperone Present] : A chaperone was present in the examining room during all aspects of the physical examination [Normal] : Bladder: Not distended, no tenderness [FreeTextEntry1] : Gloria Burton PA-C

## 2022-02-08 NOTE — HISTORY OF PRESENT ILLNESS
[FreeTextEntry1] : 49 yo P4 via 4  chemo induced menopause  referred by Dr Hansen for R adnexal mass. Patient with history of stage IA invasive ductal carcinoma of L breast diagnosed in  s/p bilateral mastectomy and reconstruction performed by Dr Greenfield and adjuvant chemotherapy, patient follows with Dr Ellison q 6 months. She has been on tamoxifen since  and has not had any bleeding since that time. Patient reports her anemia had been noticeably worsening within the past few months, she was sent for a colonoscopy (10/28/21) which visualized a single semi pedunculated 4 cm non-bleeding polyp of multilobular appearance in the cecum. She was referred to Dr Mojica for removal of this lesion. CT AP 21 with 5.2 x 3.3 cm right adnexal mass with fluid centrally. Follow up US 21 with uterus measuring 6.5 x 4.6 x 4.0 cm, within normal limits, endometrium 4 mm within normal limits. R ovary with scattered simple appearing cysts with largest measuring 2.5 cm, unchanged. Tumor markers performed 21 were wnl, CEA (3.7), CA-125 (10), CA-19-9 (<2). Color genetics 2017 without any mutations identified. Patient denies change in bowel/bladder habits, dyspaurenia, post coital bleeding, unintentional weight loss.\par \par Lpap 2021 wnl\par Lcolonoscopy:  tubular adenoma

## 2022-02-08 NOTE — ASU DISCHARGE PLAN (ADULT/PEDIATRIC) - NS MD DC FALL RISK RISK
For information on Fall & Injury Prevention, visit: [email or web address] OR  [email or web address] OR  https://www.cdc.gov/steadi/patient.html

## 2022-02-15 ENCOUNTER — NON-APPOINTMENT (OUTPATIENT)
Age: 52
End: 2022-02-15

## 2022-02-15 PROBLEM — N83.201 UNSPECIFIED OVARIAN CYST, RIGHT SIDE: Chronic | Status: ACTIVE | Noted: 2022-02-04

## 2022-02-22 NOTE — END OF VISIT
[FreeTextEntry3] : Written by Genna Kennedy, acting as a scribe for Dr. Carline Mckinney.\par This note accurately reflects the work and decisions made by me.

## 2022-02-22 NOTE — HISTORY OF PRESENT ILLNESS
[FreeTextEntry1] : 50 y/o patient with right adnexal mass and history of breast cancer, currently on Tamoxifen (planned for 10 years). Patient has chemo-induced menopause. She was initially seen for consult on 12/21/21 where I discussed options for management including observation with repeat US vs. surgical intervention with laparoscopic RSO possible LSO possible staging. We discussed her ER positive breast cancer for which B/L oophorectomy may decrease risk of recurrence. Today she reports persistent LRQ pain, consistent with her cyst - on a scale, pain measures 6/10 and worsens dependent on patient's positioning. She avoids taking NSAIDS or other pain medications. \par \par US Pelvis/TVUS (1/14/22) - \par Normal uterus.\par Normal endometrial lining with a Essure coils.\par Right ovary demonstrates a 3 cysts one of which was filled with low-level echoes. This had been identified on previous sonogram. Follow-up exam is recommended in 6 months time.\par Left ovary appeared normal.

## 2022-02-22 NOTE — ASSESSMENT
[FreeTextEntry1] : I reviewed recent ultrasound which was overall stable and less concerning when compared to 12/2021 imaging. Cyst has a benign appearance, similar to its quality from prior imaging years ago. Given absence of rapid growth and benign appearance, conservative management with interval ultrasound would be appropriate in this case. Patient also has the option to remove this cyst and the ovary laparoscopically under anesthesia. I also discussed her option to prophylactically remove the left ovary as well. Due to her persistent pain, I advise the patient to remove her right ovary but she may opt to continue with conservative management. Due to lack of concerning findings on her most recent US, I advised the patient that if needed, she may take additional time to decide her course of treatment moving forward. I also discussed with patient a dilation with curettage and possible hysteroscopy to rule out malignancy given her history of breast cancer. \par \par After her mother inquired about a hysterectomy, I do not recommend patient to pursue this procedure unless cancer is found in the ovary. Patient should f/u with Dr. Ellison following her procedure to inquire about aromatase inhibitor as an alternative to tamoxifen. \par \par Patient underwent tubal ligation following completion of childbearing.\par \par I discussed in detail the purpose, nature, risks, and benefits of a dilation with curettage and possible hysteroscopy, laparoscopic bilateral salpingo-oophorectomy, and possible hysterectomy/staging.The patient understands the risks to include (but not be limited to) bowel injury, bleeding (with the possible need for transfusion), bladder or ureteral injury, infections, deep venous thrombosis, and sandra-operative death. The patient also understands that her surgery may not be able to be performed laparoscopically and that she may need a laparotomy.  She also understands the limitations of laparoscopic surgery and the possibility of missing a surgical complication with need for subsequent re-exploration.  She agrees to proceed.  She asked numerous questions which were answered to her satisfaction.  She understands the need for a clear liquid diet the day before her procedure and agrees to comply with our instructions.  She also understands the rationale for surgical staging should a malignancy be encountered and understands that that may require a larger surgery and even, possibly, a laparotomy.

## 2022-02-22 NOTE — REASON FOR VISIT
[Parent] : parent [FreeTextEntry1] : [place] Location\par \par [facility] [phone] at [address] [place] [id number] \par \par Right adnexal mass\par \par Review US results/surgical planning

## 2022-03-02 ENCOUNTER — APPOINTMENT (OUTPATIENT)
Dept: GYNECOLOGIC ONCOLOGY | Facility: CLINIC | Age: 52
End: 2022-03-02
Payer: COMMERCIAL

## 2022-03-02 VITALS
HEIGHT: 65 IN | BODY MASS INDEX: 28.82 KG/M2 | SYSTOLIC BLOOD PRESSURE: 110 MMHG | DIASTOLIC BLOOD PRESSURE: 82 MMHG | WEIGHT: 173 LBS | TEMPERATURE: 97.5 F

## 2022-03-02 DIAGNOSIS — N83.291 OTHER OVARIAN CYST, RIGHT SIDE: ICD-10-CM

## 2022-03-02 PROCEDURE — 99212 OFFICE O/P EST SF 10 MIN: CPT

## 2022-03-27 ENCOUNTER — FORM ENCOUNTER (OUTPATIENT)
Age: 52
End: 2022-03-27

## 2022-03-27 PROBLEM — N83.291 OTHER OVARIAN CYST, RIGHT SIDE: Status: ACTIVE | Noted: 2021-12-07

## 2022-03-27 NOTE — DISCUSSION/SUMMARY
[Clean] : was clean [Intact] : was intact [None] : had no drainage [Normal Skin] : normal appearance [Doing Well] : is doing well [Excellent Pain Control] : has excellent pain control [No Sign of Infection] : is showing no signs of infection [Dry] : was not dry [Erythema] : was not erythematous [Ecchymosis] : was not ecchymotic [Seroma] : had no seroma [Firm] : soft [Tender] : nontender [Abnormal Bowel Sounds] : normal bowel sounds [Rebound] : no rebound tenderness [Guarding] : no guarding [Incisional Hernia] : no incisional hernia [Mass] : no palpable mass [Cervical Abnormality] : normal cervix [External Genitalia Abnormal] : normal external genitalia [Vaginal Exam Abnormal] : normal vaginal exam [de-identified] : Genna Kennedy MA was present the entire time of gynecological exam.  [FreeTextEntry1] : \par PATHOLOGY:\par 1. Ovary and fallopian tube, right, excision:\par Ovary - Benign fibrous nodule. Endosalpingosis\par Fallopian tube - Benign\par \par 2. Ascitic fluid:\par -Negative for malignant cells\par -Benign reactive mesothelial cells\par \par 3. Ovary and fallopian tube, left, excision:\par -Ovary - Benign\par -Fallopian tube - Benign\par \par 4. Endometrium, curettage:\par -Scant benign inactive endometrial glands with breakdown changes\par -Predominantly benign ectocervical and endocervical epithelium with\par inflammation and reactive changes\par \par 5. Infracolic omentum, biopsy:\par -Negative for malignancy\par -Foci of fibrosis

## 2022-03-27 NOTE — REASON FOR VISIT
[Post Op] : post op visit [Other ___] : [unfilled] [de-identified] : 2/8/22 [de-identified] : C BSO, omental biopsy, D & C at [facility] [de-identified] : The patient is healing remarkably well.  She is tolerating PO without nausea or vomiting. She denies any change in bowel or bladder habits. She denies VB. She admits to vaginal discharge associated with some foul odor. She denies CP/SOB. She denies any other associated signs or symptoms.

## 2022-03-27 NOTE — ASSESSMENT
[FreeTextEntry1] : The patient is healing well without complication. We discussed ongoing recuperation and reviewed final pathology results in detail. We reviewed the need for ongoing GYN visits and a recommended plan to follow up with regular GYN. Normal physiologic discharge on exam - no concern, no sign of infection. She understands that if the discharge changes in nature or if foul odor becomes stronger, she should return for f/u evaluation. The patient stated and expressed a good understanding of the above information.

## 2022-07-05 ENCOUNTER — OUTPATIENT (OUTPATIENT)
Dept: OUTPATIENT SERVICES | Facility: HOSPITAL | Age: 52
LOS: 1 days | Discharge: HOME | End: 2022-07-05

## 2022-07-05 DIAGNOSIS — C50.912 MALIGNANT NEOPLASM OF UNSPECIFIED SITE OF LEFT FEMALE BREAST: ICD-10-CM

## 2022-07-05 DIAGNOSIS — Z90.13 ACQUIRED ABSENCE OF BILATERAL BREASTS AND NIPPLES: Chronic | ICD-10-CM

## 2022-07-18 ENCOUNTER — RESULT REVIEW (OUTPATIENT)
Age: 52
End: 2022-07-18

## 2022-07-18 ENCOUNTER — APPOINTMENT (OUTPATIENT)
Dept: HEMATOLOGY ONCOLOGY | Facility: CLINIC | Age: 52
End: 2022-07-18

## 2022-07-18 VITALS
WEIGHT: 172.4 LBS | OXYGEN SATURATION: 99 % | SYSTOLIC BLOOD PRESSURE: 126 MMHG | BODY MASS INDEX: 28.72 KG/M2 | TEMPERATURE: 97.3 F | DIASTOLIC BLOOD PRESSURE: 86 MMHG | HEART RATE: 82 BPM | HEIGHT: 65 IN | RESPIRATION RATE: 17 BRPM

## 2022-07-18 DIAGNOSIS — T45.1X5A PAIN IN UNSPECIFIED JOINT: ICD-10-CM

## 2022-07-18 DIAGNOSIS — M25.50 PAIN IN UNSPECIFIED JOINT: ICD-10-CM

## 2022-07-18 DIAGNOSIS — Z13.71 ENCOUNTER FOR NONPROCREATIVE SCREENING FOR GENETIC DISEASE CARRIER STATUS: ICD-10-CM

## 2022-07-18 LAB
BASOPHILS # BLD AUTO: 0.01 K/UL — SIGNIFICANT CHANGE UP (ref 0–0.2)
BASOPHILS NFR BLD AUTO: 0.2 % — SIGNIFICANT CHANGE UP (ref 0–2)
EOSINOPHIL # BLD AUTO: 0.08 K/UL — SIGNIFICANT CHANGE UP (ref 0–0.5)
EOSINOPHIL NFR BLD AUTO: 1.9 % — SIGNIFICANT CHANGE UP (ref 0–6)
HCT VFR BLD CALC: 36.4 % — SIGNIFICANT CHANGE UP (ref 34.5–45)
HGB BLD-MCNC: 12 G/DL — SIGNIFICANT CHANGE UP (ref 11.5–15.5)
IMM GRANULOCYTES NFR BLD AUTO: 0.2 % — SIGNIFICANT CHANGE UP (ref 0–1.5)
LYMPHOCYTES # BLD AUTO: 1.93 K/UL — SIGNIFICANT CHANGE UP (ref 1–3.3)
LYMPHOCYTES # BLD AUTO: 45.8 % — HIGH (ref 13–44)
MCHC RBC-ENTMCNC: 24.7 PG — LOW (ref 27–34)
MCHC RBC-ENTMCNC: 33 G/DL — SIGNIFICANT CHANGE UP (ref 32–36)
MCV RBC AUTO: 75.1 FL — LOW (ref 80–100)
MONOCYTES # BLD AUTO: 0.36 K/UL — SIGNIFICANT CHANGE UP (ref 0–0.9)
MONOCYTES NFR BLD AUTO: 8.6 % — SIGNIFICANT CHANGE UP (ref 2–14)
NEUTROPHILS # BLD AUTO: 1.82 K/UL — SIGNIFICANT CHANGE UP (ref 1.8–7.4)
NEUTROPHILS NFR BLD AUTO: 43.3 % — SIGNIFICANT CHANGE UP (ref 43–77)
NRBC # BLD: 0 /100 WBCS — SIGNIFICANT CHANGE UP (ref 0–0)
PLATELET # BLD AUTO: 215 K/UL — SIGNIFICANT CHANGE UP (ref 150–400)
RBC # BLD: 4.85 M/UL — SIGNIFICANT CHANGE UP (ref 3.8–5.2)
RBC # FLD: 13.8 % — SIGNIFICANT CHANGE UP (ref 10.3–14.5)
WBC # BLD: 4.21 K/UL — SIGNIFICANT CHANGE UP (ref 3.8–10.5)
WBC # FLD AUTO: 4.21 K/UL — SIGNIFICANT CHANGE UP (ref 3.8–10.5)

## 2022-07-18 PROCEDURE — 99214 OFFICE O/P EST MOD 30 MIN: CPT

## 2022-07-18 NOTE — HISTORY OF PRESENT ILLNESS
[Disease: _____________________] : Disease: [unfilled] [T: ___] : T[unfilled] [N: ___] : N[unfilled] [M: ___] : M[unfilled] [AJCC Stage: ____] : AJCC Stage: [unfilled] [IA] : IA [de-identified] : Ms. Bermeo is a 49 y/o female with stage IA invasive ductal carcinoma of left breast, ER+/NM+/HER2- s/p bilateral mastectomy and adjuvant chemotherapy with TC x4 here for f/u. Her oncologic history is as follows: \par \par She underwent a screening mammography on 10/16/2015 because of a lump she had palpated in the left breast, which revealed two clusters of heterogeneous microcalcifications in the left breast.  This was followed by an ultrasound  which revealed a 0.9 x 1.3 x 0.6cm poorly marginated/microlobulated, heterogeneously hypoechoic nodule in the left breast, directly beneath the region of interest of the palpable lump, 9:00 position 1 cm from the nipple. She underwent a core biopsy of the nodule on 11/4/2015 which revealed DCIS, solid and cribriform patterns, high grade nuclear atypia and focal necrosis. ER 60%, NM 30%. This biopsy was followed by stereotactic core biopsies\par of the two groupings of calcifications in the left breast on 11/6/2015. Biopsy of ''left 9:00 anterior" revealed  DCIS and biopsy of "left 9:00 posterior" revealed grade 2 IDC along with DCIS. ER 95%, NM 95%, HER2 0/1+. ER/NM repeated on DCIS (but unclear if on anterior or posterior block): ER 95%, NM 95%.\par \par Following the biopsies, she had a bilateral breast MRI on 11/19/15.  The left breast revealed a large ~5cm area of nonmass enhancement extending from the upper inner breast, involving the palpable area. Additionally, there was a 5.3cm rim enhancing mass at the superior posterior central left breast likely a combination of post biopsy and hematoma and residual disease. 4 mm focus of enhancement lower outer left breast. The right breast demonstrated a 11 mm heterogeneous focal nonmass enhancement central right breast. \par Bilateral prominent axillary lymph nodes. She underwent biopsies of both axillary LNs on 12/9/15: right LN: fragmented, unremarkable LN. Left LN: unremarkable breast tissue, no lymphoid tissue identified. She also had a MRI guided core biopsy of the right breast on 12/11/15: benign.  \par \par 1/18/16- Bilateral mastectomy (patient preference; genetics neg) and SLNB with ZANDER reconstruction-  \par Right mastectomy: proliferative fibrocystic changes with usual ductal hyperplasia, calcification, biopsy site changes, columnar cell change, sclerosing adenosis and stromal fibrosis, 0/3 nodes\par Left mastectomy: Invasive ductal carcinoma, stage IA ([id number])\par Size: 3 foci, largest 9 mm (others 4mm, 5mm)   stGstrstastdstest:st st1st LNs: Negative (0/3 sentinel LNs, 0/2 internal mammary LNs)  DCIS: Present  Margins: Negative  LVI: absent \par ER 80%, NM 90%, HER2 negative \par Oncotype: 32\par  \par Adjuvant chemotherapy: TC x4 (3/11/16-5/13/16)\par Tamoxifen (8/1/16-)\par \par Her medical history is notable for iron deficiency anemia for which she received IV iron in November 2015.  She had a CT c/a/p, EGD and colonoscopy, all of which were reportedly normal. She states she has had iron deficiency in the past but never to this degree. No clear etiology of iron deficiency so far. 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 Sherice Ellison for evaluation of hypercoagulable state prior to surgery. Hypercoagulable w/u negative but given her history, prophylactic lovenox was recommended for 4 weeks postop.  [de-identified] : Moderately differentiated invasive ductal carcinoma [de-identified] : ER 80%, MT 90%, HER2 negative  [de-identified] : Genetics: CancerNext 32 Gene Panel from [facility]: Negative  [FreeTextEntry1] : Tamoxifen 8/2016- 9/2018; Anastrazole since 9/2018- 1/2019 exemestane 1/2019.  [de-identified] : Ms. BRE GORE is here for a follow up of left breast cancer on endocrine therapy since 8/2016. She tried AI x 2 after menopause but had to stop due to intolerable s/e. \par She is tolerating tamoxifen well. Good compliance. She denies mood changes, wt gain, vaginal dryness, SOB, chest pain, leg swelling or clotting issues. Her energy and appetite is good, wt stable. Mild hot flashes are manageable. She injured her back lifting heavy wt, s/p Xray and PT and improved significantly. \par She is postmenopausal. No PMB. GYN - Dr Hansen. Saw 12/2021. s/p BSO 2/2022- benign . Endometrial bx benign\par colonoscopy 12/2021- tubular adenoma, cecal \par Breast imaging: not needed\par Opthal: She has glasses, seeing annually\par Bone scan  2/2019: negative  for mets \par Dexa scan 5/2019: normal cont ca and vit d \par She has complete 5 yrs of endocrine therapy but we discussed that tamoxifen is typically for 10 yrs. Pt in agreement to continue x 10 yrs\par Noted to have mild anemia, no GI bleeding. rec C scope, cbc repeated today showed improvement. Encouraged GI f/u

## 2022-07-18 NOTE — REASON FOR VISIT
[Follow-Up Visit] : a follow-up [Other: _____] : [unfilled] [FreeTextEntry2] : breast cancer ER/[place] Positive, HER-2 NEGATIVE

## 2022-07-18 NOTE — PHYSICAL EXAM
[Fully active, able to carry on all pre-disease performance without restriction] : Status 0 - Fully active, able to carry on all pre-disease performance without restriction [Normal] : affect appropriate [de-identified] : BL mastectomies with reconstruction, no chest wall or axillary nodules

## 2022-07-18 NOTE — ASSESSMENT
[Curative] : Goals of care discussed with patient: Curative [FreeTextEntry1] : Ms. Bermeo is a 50 y/o female with stage IA ([id number]) moderately differentiated ER+/CA+/HER2- invasive ductal carcinoma of the left breast s/p bilateral mastectomy, SLNB and ZANDER reconstruction 1/18/16, Oncotype 32, completed adjuvant chemotherapy with TCx4, on tamoxifen (8/1/16-9/2018), switched to anastrozole 9/2018, exemestane 1/2019. Suffered arthralgias 2/2 AI. Tamoxifen restarted 8/2019\par \par - Breast ca: She is tolerating tamoxifen very well without significant side effects. Reports good compliance. Plan to continue tamoxifen for 10 years (8/2026). GYN and opthal f/u annually due to risk of endometrial ca and cataracts respectively.  No breast imaging needed. She has complete 5 yrs of endocrine therapy but we discussed that tamoxifen is typically for 10 yrs. Pt in agreement to continue x 10 yrs\par - ANEMIA: Noted to have mild anemia, no GI bleeding. colonoscopy 12/2021- tubular adenoma, cecal. cbc repeated today showed improvement. Encouraged GI f/u. Anemia work up negative\par - enlarged ovary/adnexal mass: She is postmenopausal. No PMB. s/p BSO benign path\par -  Mild hot flashes: 2/2 tamoxifen.  Advised to wear layers and use fan prn. Consider Effexor if get worse.\par - Concern for Wt gain 2/2 tamoxifen:Life style modifications, healthy diet and exercise d/w her\par - Patient is aware of signs and symptoms of DVT/PE. Patient will report if she develops acute onset chest pain shortness of breath, leg swelling or calf pain. \par - Continue to followup with primary care for HTN control. ASA 81\par - Low Vitamin D: concern for worsening bone loss due to low vit D. Discussed to increase Vit D intake \par \par RTC 6m

## 2022-07-19 ENCOUNTER — NON-APPOINTMENT (OUTPATIENT)
Age: 52
End: 2022-07-19

## 2022-07-19 LAB
25(OH)D3 SERPL-MCNC: 28.5 NG/ML
ALBUMIN SERPL ELPH-MCNC: 4.1 G/DL
ALP BLD-CCNC: 55 U/L
ALT SERPL-CCNC: 12 U/L
ANION GAP SERPL CALC-SCNC: 9 MMOL/L
AST SERPL-CCNC: 15 U/L
BILIRUB SERPL-MCNC: 0.2 MG/DL
BUN SERPL-MCNC: 8 MG/DL
CALCIUM SERPL-MCNC: 9.3 MG/DL
CHLORIDE SERPL-SCNC: 105 MMOL/L
CO2 SERPL-SCNC: 25 MMOL/L
CREAT SERPL-MCNC: 0.73 MG/DL
EGFR: 98 ML/MIN/1.73M2
GLUCOSE SERPL-MCNC: 96 MG/DL
POTASSIUM SERPL-SCNC: 3.8 MMOL/L
PROT SERPL-MCNC: 7.7 G/DL
SODIUM SERPL-SCNC: 139 MMOL/L

## 2023-01-09 NOTE — ED PROVIDER NOTE - PROGRESS NOTE DETAILS
pt feeling better, would like to go home. offered more pain med, doesn't want pain more. cancel percocet.
Sepsis Criteria were met:

## 2023-01-18 ENCOUNTER — OUTPATIENT (OUTPATIENT)
Dept: OUTPATIENT SERVICES | Facility: HOSPITAL | Age: 53
LOS: 1 days | Discharge: HOME | End: 2023-01-18

## 2023-01-18 DIAGNOSIS — Z90.13 ACQUIRED ABSENCE OF BILATERAL BREASTS AND NIPPLES: Chronic | ICD-10-CM

## 2023-01-18 DIAGNOSIS — C50.912 MALIGNANT NEOPLASM OF UNSPECIFIED SITE OF LEFT FEMALE BREAST: ICD-10-CM

## 2023-01-20 ENCOUNTER — RESULT REVIEW (OUTPATIENT)
Age: 53
End: 2023-01-20

## 2023-01-20 ENCOUNTER — APPOINTMENT (OUTPATIENT)
Dept: HEMATOLOGY ONCOLOGY | Facility: CLINIC | Age: 53
End: 2023-01-20
Payer: COMMERCIAL

## 2023-01-20 VITALS
WEIGHT: 169.76 LBS | OXYGEN SATURATION: 98 % | SYSTOLIC BLOOD PRESSURE: 126 MMHG | HEART RATE: 77 BPM | TEMPERATURE: 97 F | DIASTOLIC BLOOD PRESSURE: 87 MMHG | RESPIRATION RATE: 16 BRPM | BODY MASS INDEX: 28.25 KG/M2

## 2023-01-20 DIAGNOSIS — R23.2 FLUSHING: ICD-10-CM

## 2023-01-20 LAB
BASOPHILS # BLD AUTO: 0.02 K/UL — SIGNIFICANT CHANGE UP (ref 0–0.2)
BASOPHILS NFR BLD AUTO: 0.5 % — SIGNIFICANT CHANGE UP (ref 0–2)
EOSINOPHIL # BLD AUTO: 0.06 K/UL — SIGNIFICANT CHANGE UP (ref 0–0.5)
EOSINOPHIL NFR BLD AUTO: 1.6 % — SIGNIFICANT CHANGE UP (ref 0–6)
HCT VFR BLD CALC: 37.6 % — SIGNIFICANT CHANGE UP (ref 34.5–45)
HGB BLD-MCNC: 12.3 G/DL — SIGNIFICANT CHANGE UP (ref 11.5–15.5)
IMM GRANULOCYTES NFR BLD AUTO: 0.3 % — SIGNIFICANT CHANGE UP (ref 0–0.9)
LYMPHOCYTES # BLD AUTO: 1.87 K/UL — SIGNIFICANT CHANGE UP (ref 1–3.3)
LYMPHOCYTES # BLD AUTO: 50 % — HIGH (ref 13–44)
MCHC RBC-ENTMCNC: 24.8 PG — LOW (ref 27–34)
MCHC RBC-ENTMCNC: 32.7 G/DL — SIGNIFICANT CHANGE UP (ref 32–36)
MCV RBC AUTO: 76 FL — LOW (ref 80–100)
MONOCYTES # BLD AUTO: 0.32 K/UL — SIGNIFICANT CHANGE UP (ref 0–0.9)
MONOCYTES NFR BLD AUTO: 8.6 % — SIGNIFICANT CHANGE UP (ref 2–14)
NEUTROPHILS # BLD AUTO: 1.46 K/UL — LOW (ref 1.8–7.4)
NEUTROPHILS NFR BLD AUTO: 39 % — LOW (ref 43–77)
NRBC # BLD: 0 /100 WBCS — SIGNIFICANT CHANGE UP (ref 0–0)
PLATELET # BLD AUTO: 230 K/UL — SIGNIFICANT CHANGE UP (ref 150–400)
RBC # BLD: 4.95 M/UL — SIGNIFICANT CHANGE UP (ref 3.8–5.2)
RBC # FLD: 14.3 % — SIGNIFICANT CHANGE UP (ref 10.3–14.5)
WBC # BLD: 3.74 K/UL — LOW (ref 3.8–10.5)
WBC # FLD AUTO: 3.74 K/UL — LOW (ref 3.8–10.5)

## 2023-01-20 PROCEDURE — 99214 OFFICE O/P EST MOD 30 MIN: CPT

## 2023-01-20 NOTE — HISTORY OF PRESENT ILLNESS
[Disease: _____________________] : Disease: [unfilled] [T: ___] : T[unfilled] [N: ___] : N[unfilled] [M: ___] : M[unfilled] [AJCC Stage: ____] : AJCC Stage: [unfilled] [IA] : IA [de-identified] : Ms. Bermeo is a 47 y/o female with stage IA invasive ductal carcinoma of left breast, ER+/IA+/HER2- s/p bilateral mastectomy and adjuvant chemotherapy with TC x4 here for f/u. Her oncologic history is as follows: \par \par She underwent a screening mammography on 10/16/2015 because of a lump she had palpated in the left breast, which revealed two clusters of heterogeneous microcalcifications in the left breast.  This was followed by an ultrasound  which revealed a 0.9 x 1.3 x 0.6cm poorly marginated/microlobulated, heterogeneously hypoechoic nodule in the left breast, directly beneath the region of interest of the palpable lump, 9:00 position 1 cm from the nipple. She underwent a core biopsy of the nodule on 11/4/2015 which revealed DCIS, solid and cribriform patterns, high grade nuclear atypia and focal necrosis. ER 60%, IA 30%. This biopsy was followed by stereotactic core biopsies\par of the two groupings of calcifications in the left breast on 11/6/2015. Biopsy of ''left 9:00 anterior" revealed  DCIS and biopsy of "left 9:00 posterior" revealed grade 2 IDC along with DCIS. ER 95%, IA 95%, HER2 0/1+. ER/IA repeated on DCIS (but unclear if on anterior or posterior block): ER 95%, IA 95%.\par \par Following the biopsies, she had a bilateral breast MRI on 11/19/15.  The left breast revealed a large ~5cm area of nonmass enhancement extending from the upper inner breast, involving the palpable area. Additionally, there was a 5.3cm rim enhancing mass at the superior posterior central left breast likely a combination of post biopsy and hematoma and residual disease. 4 mm focus of enhancement lower outer left breast. The right breast demonstrated a 11 mm heterogeneous focal nonmass enhancement central right breast. \par Bilateral prominent axillary lymph nodes. She underwent biopsies of both axillary LNs on 12/9/15: right LN: fragmented, unremarkable LN. Left LN: unremarkable breast tissue, no lymphoid tissue identified. She also had a MRI guided core biopsy of the right breast on 12/11/15: benign.  \par \par 1/18/16- Bilateral mastectomy (patient preference; genetics neg) and SLNB with ZANDER reconstruction-  \par Right mastectomy: proliferative fibrocystic changes with usual ductal hyperplasia, calcification, biopsy site changes, columnar cell change, sclerosing adenosis and stromal fibrosis, 0/3 nodes\par Left mastectomy: Invasive ductal carcinoma, stage IA ([id number])\par Size: 3 foci, largest 9 mm (others 4mm, 5mm)   rdGrdrrdarddrderd:rd rd3rd LNs: Negative (0/3 sentinel LNs, 0/2 internal mammary LNs)  DCIS: Present  Margins: Negative  LVI: absent \par ER 80%, IA 90%, HER2 negative \par Oncotype: 32\par  \par Adjuvant chemotherapy: TC x4 (3/11/16-5/13/16)\par Tamoxifen (8/1/16-)\par \par Her medical history is notable for iron deficiency anemia for which she received IV iron in November 2015.  She had a CT c/a/p, EGD and colonoscopy, all of which were reportedly normal. She states she has had iron deficiency in the past but never to this degree. No clear etiology of iron deficiency so far. 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 Sherice Ellison for evaluation of hypercoagulable state prior to surgery. Hypercoagulable w/u negative but given her history, prophylactic lovenox was recommended for 4 weeks postop.  [de-identified] : Moderately differentiated invasive ductal carcinoma [de-identified] : ER 80%, [place] 90%, HER2 negative  [de-identified] : Genetics: CancerNext 32 Gene Panel from [facility]: Negative  [FreeTextEntry1] : Tamoxifen 8/2016- 9/2018; Anastrazole since 9/2018- 1/2019 exemestane 1/2019.  [de-identified] : Ms. BRE GORE is here for a follow up of left breast cancer on endocrine therapy since 8/2016. She tried AI x 2 after menopause but had to stop due to intolerable s/e. \par \par She is tolerating tamoxifen well. Good compliance. She denies mood changes, wt gain, vaginal dryness, SOB, chest pain, leg swelling or clotting issues. Her energy and appetite is good, wt stable. Mild hot flashes are manageable. She injured her back lifting heavy wt, s/p Xray and PT and improved significantly. \par She is postmenopausal. No PMB. GYN - Dr Hansen. Saw 12/2022. s/p BSO 2/2022- benign . Endometrial bx benign\par colonoscopy 12/2021- tubular adenoma, cecal \par Breast imaging: not needed\par Opthal: She has glasses, seeing annually\par Bone scan  2/2019: negative  for mets \par Dexa scan 5/2019: normal cont ca and vit d \par She has complete 5 yrs of endocrine therapy but we discussed that tamoxifen is typically for 10 yrs. Pt in agreement to continue x 10 yrs\par Noted to have mild anemia, no GI bleeding. rec C scope, cbc repeated today showed improvement. Encouraged GI f/u\par She is taking ASA 81

## 2023-01-20 NOTE — ASSESSMENT
[Curative] : Goals of care discussed with patient: Curative [FreeTextEntry1] : Ms. Bermeo is a 52 y/o female with stage IA ([id number]) moderately differentiated ER+/WI+/HER2- invasive ductal carcinoma of the left breast s/p bilateral mastectomy, SLNB and ZANDER reconstruction 1/18/16, Oncotype 32, completed adjuvant chemotherapy with TCx4, on tamoxifen (8/1/16-9/2018), switched to anastrozole 9/2018, exemestane 1/2019. Suffered arthralgias 2/2 AI. Tamoxifen restarted 8/2019\par \par - Breast ca: She is tolerating tamoxifen very well without significant side effects. Reports good compliance. Plan to continue tamoxifen for 10 years (8/2026). GYN and opthal f/u annually due to risk of endometrial ca and cataracts respectively.  No breast imaging needed. She has complete 5 yrs of endocrine therapy but we discussed that tamoxifen is typically for 10 yrs. Pt in agreement to continue x 10 yrs\par - ANEMIA: Noted to have mild anemia, no GI bleeding. colonoscopy 12/2021- tubular adenoma, cecal. cbc repeated today showed improvement. Encouraged GI f/u. Anemia work up negative\par - enlarged ovary/adnexal mass: She is postmenopausal. No PMB. s/p BSO benign path\par -  Mild hot flashes: 2/2 tamoxifen.  Advised to wear layers and use fan prn. Consider Effexor if get worse.\par - Concern for Wt gain 2/2 tamoxifen:Life style modifications, healthy diet and exercise d/w her\par - Patient is aware of signs and symptoms of DVT/PE. Patient will report if she develops acute onset chest pain shortness of breath, leg swelling or calf pain. \par - Continue to followup with primary care for HTN control. ASA 81\par - Low Vitamin D: concern for worsening bone loss due to low vit D. Discussed to increase Vit D intake \par \par RTC 6m

## 2023-01-20 NOTE — REASON FOR VISIT
[Follow-Up Visit] : a follow-up [Other: _____] : [unfilled] [FreeTextEntry2] : breast cancer ER/SD Positive, HER-2 NEGATIVE

## 2023-01-20 NOTE — PHYSICAL EXAM
[Fully active, able to carry on all pre-disease performance without restriction] : Status 0 - Fully active, able to carry on all pre-disease performance without restriction [Normal] : affect appropriate [de-identified] : BL mastectomies with reconstruction, no chest wall or axillary nodules

## 2023-01-24 LAB
ALBUMIN SERPL ELPH-MCNC: 4.4 G/DL
ALP BLD-CCNC: 52 U/L
ALT SERPL-CCNC: 12 U/L
ANION GAP SERPL CALC-SCNC: 10 MMOL/L
AST SERPL-CCNC: 17 U/L
BILIRUB SERPL-MCNC: 0.4 MG/DL
BUN SERPL-MCNC: 8 MG/DL
CALCIUM SERPL-MCNC: 9.7 MG/DL
CHLORIDE SERPL-SCNC: 105 MMOL/L
CO2 SERPL-SCNC: 26 MMOL/L
CREAT SERPL-MCNC: 0.74 MG/DL
EGFR: 96 ML/MIN/1.73M2
GLUCOSE SERPL-MCNC: 94 MG/DL
POTASSIUM SERPL-SCNC: 4 MMOL/L
PROT SERPL-MCNC: 7.8 G/DL
SODIUM SERPL-SCNC: 141 MMOL/L

## 2023-04-03 ENCOUNTER — APPOINTMENT (OUTPATIENT)
Dept: OBGYN | Facility: CLINIC | Age: 53
End: 2023-04-03

## 2023-04-12 ENCOUNTER — APPOINTMENT (OUTPATIENT)
Dept: OBGYN | Facility: CLINIC | Age: 53
End: 2023-04-12
Payer: COMMERCIAL

## 2023-04-12 VITALS
BODY MASS INDEX: 29.09 KG/M2 | DIASTOLIC BLOOD PRESSURE: 83 MMHG | SYSTOLIC BLOOD PRESSURE: 127 MMHG | HEART RATE: 98 BPM | WEIGHT: 174.6 LBS | HEIGHT: 65 IN

## 2023-04-12 DIAGNOSIS — Z01.419 ENCOUNTER FOR GYNECOLOGICAL EXAMINATION (GENERAL) (ROUTINE) W/OUT ABNORMAL FINDINGS: ICD-10-CM

## 2023-04-12 DIAGNOSIS — I10 ESSENTIAL (PRIMARY) HYPERTENSION: ICD-10-CM

## 2023-04-12 PROCEDURE — 99396 PREV VISIT EST AGE 40-64: CPT

## 2023-04-12 NOTE — HISTORY OF PRESENT ILLNESS
[FreeTextEntry1] : 52 y.o. P 5264  postmenopausal female for annual exam. pt has breast cancer, s/p bilat. mastectomy in Jan. 2016. s/p Chemo, now on Tamoxifen  x 10 years ( in for 7 years now). , pt has HTN, on Amlodipine 10mg daily, , [place] pt is s/p BTL and laparoscopic BSO. - dr. Mckinney \par Dr. Kamryn Ellison is Oncologist,\par pt is due for pap \par pt is due for Dexa, \par EGD - 10/28/21- negative, \par Last colonoscopy Dec. 15 2021 - Tubular adenoma recommended f/u was for 1 year.

## 2023-04-12 NOTE — PHYSICAL EXAM
[Appropriately responsive] : appropriately responsive [Alert] : alert [No Acute Distress] : no acute distress [No Lymphadenopathy] : no lymphadenopathy [Regular Rate Rhythm] : regular rate rhythm [No Murmurs] : no murmurs [Clear to Auscultation B/L] : clear to auscultation bilaterally [Soft] : soft [Non-tender] : non-tender [Non-distended] : non-distended [No HSM] : No HSM [No Lesions] : no lesions [No Mass] : no mass [Oriented x3] : oriented x3 [Labia Majora] : normal [Labia Minora] : normal [Normal] : normal [Uterine Adnexae] : absent

## 2023-04-12 NOTE — DISCUSSION/SUMMARY
[FreeTextEntry1] : A - WWV\par       breast ca s/p chemo , on Tamoxifen\par      HTN\par     menopause\par \par p- f/u 1 year\par       pap done \par    referral for colorectal follow up given - Samson Mojica M.D.\par     exercise encouraged\par     nutritional counseling provided\par    pt does not require breast imaging, as pt has had bilat. mastectomy with reconstruction\par     referral for Dexa given

## 2023-04-18 LAB
CYTOLOGY CVX/VAG DOC THIN PREP: ABNORMAL
HPV HIGH+LOW RISK DNA PNL CVX: NOT DETECTED

## 2023-05-10 ENCOUNTER — OUTPATIENT (OUTPATIENT)
Dept: OUTPATIENT SERVICES | Facility: HOSPITAL | Age: 53
LOS: 1 days | End: 2023-05-10
Payer: COMMERCIAL

## 2023-05-10 ENCOUNTER — APPOINTMENT (OUTPATIENT)
Dept: RADIOLOGY | Facility: IMAGING CENTER | Age: 53
End: 2023-05-10
Payer: COMMERCIAL

## 2023-05-10 DIAGNOSIS — Z00.8 ENCOUNTER FOR OTHER GENERAL EXAMINATION: ICD-10-CM

## 2023-05-10 DIAGNOSIS — Z90.13 ACQUIRED ABSENCE OF BILATERAL BREASTS AND NIPPLES: Chronic | ICD-10-CM

## 2023-05-10 PROCEDURE — 77080 DXA BONE DENSITY AXIAL: CPT

## 2023-05-10 PROCEDURE — 77080 DXA BONE DENSITY AXIAL: CPT | Mod: 26

## 2023-06-26 ENCOUNTER — OUTPATIENT (OUTPATIENT)
Dept: OUTPATIENT SERVICES | Facility: HOSPITAL | Age: 53
LOS: 1 days | Discharge: HOME | End: 2023-06-26

## 2023-06-26 DIAGNOSIS — C50.912 MALIGNANT NEOPLASM OF UNSPECIFIED SITE OF LEFT FEMALE BREAST: ICD-10-CM

## 2023-06-26 DIAGNOSIS — Z90.13 ACQUIRED ABSENCE OF BILATERAL BREASTS AND NIPPLES: Chronic | ICD-10-CM

## 2023-07-05 ENCOUNTER — APPOINTMENT (OUTPATIENT)
Dept: HEMATOLOGY ONCOLOGY | Facility: CLINIC | Age: 53
End: 2023-07-05

## 2023-07-11 ENCOUNTER — APPOINTMENT (OUTPATIENT)
Dept: GASTROENTEROLOGY | Facility: CLINIC | Age: 53
End: 2023-07-11

## 2023-07-26 ENCOUNTER — RESULT REVIEW (OUTPATIENT)
Age: 53
End: 2023-07-26

## 2023-07-26 ENCOUNTER — APPOINTMENT (OUTPATIENT)
Dept: HEMATOLOGY ONCOLOGY | Facility: CLINIC | Age: 53
End: 2023-07-26
Payer: COMMERCIAL

## 2023-07-26 VITALS
HEART RATE: 87 BPM | OXYGEN SATURATION: 100 % | BODY MASS INDEX: 28.98 KG/M2 | RESPIRATION RATE: 16 BRPM | SYSTOLIC BLOOD PRESSURE: 122 MMHG | DIASTOLIC BLOOD PRESSURE: 85 MMHG | TEMPERATURE: 97.2 F | WEIGHT: 174.16 LBS

## 2023-07-26 DIAGNOSIS — T45.1X5A FLUSHING: ICD-10-CM

## 2023-07-26 DIAGNOSIS — R23.2 FLUSHING: ICD-10-CM

## 2023-07-26 LAB
BASOPHILS # BLD AUTO: 0.03 K/UL — SIGNIFICANT CHANGE UP (ref 0–0.2)
BASOPHILS NFR BLD AUTO: 0.5 % — SIGNIFICANT CHANGE UP (ref 0–2)
EOSINOPHIL # BLD AUTO: 0.06 K/UL — SIGNIFICANT CHANGE UP (ref 0–0.5)
EOSINOPHIL NFR BLD AUTO: 1 % — SIGNIFICANT CHANGE UP (ref 0–6)
HCT VFR BLD CALC: 34.9 % — SIGNIFICANT CHANGE UP (ref 34.5–45)
HGB BLD-MCNC: 11.4 G/DL — LOW (ref 11.5–15.5)
IMM GRANULOCYTES NFR BLD AUTO: 0.2 % — SIGNIFICANT CHANGE UP (ref 0–0.9)
LYMPHOCYTES # BLD AUTO: 1.88 K/UL — SIGNIFICANT CHANGE UP (ref 1–3.3)
LYMPHOCYTES # BLD AUTO: 32.8 % — SIGNIFICANT CHANGE UP (ref 13–44)
MCHC RBC-ENTMCNC: 24.9 PG — LOW (ref 27–34)
MCHC RBC-ENTMCNC: 32.7 G/DL — SIGNIFICANT CHANGE UP (ref 32–36)
MCV RBC AUTO: 76.4 FL — LOW (ref 80–100)
MONOCYTES # BLD AUTO: 0.47 K/UL — SIGNIFICANT CHANGE UP (ref 0–0.9)
MONOCYTES NFR BLD AUTO: 8.2 % — SIGNIFICANT CHANGE UP (ref 2–14)
NEUTROPHILS # BLD AUTO: 3.29 K/UL — SIGNIFICANT CHANGE UP (ref 1.8–7.4)
NEUTROPHILS NFR BLD AUTO: 57.3 % — SIGNIFICANT CHANGE UP (ref 43–77)
NRBC # BLD: 0 /100 WBCS — SIGNIFICANT CHANGE UP (ref 0–0)
PLATELET # BLD AUTO: 184 K/UL — SIGNIFICANT CHANGE UP (ref 150–400)
RBC # BLD: 4.57 M/UL — SIGNIFICANT CHANGE UP (ref 3.8–5.2)
RBC # FLD: 14 % — SIGNIFICANT CHANGE UP (ref 10.3–14.5)
WBC # BLD: 5.74 K/UL — SIGNIFICANT CHANGE UP (ref 3.8–10.5)
WBC # FLD AUTO: 5.74 K/UL — SIGNIFICANT CHANGE UP (ref 3.8–10.5)

## 2023-07-26 PROCEDURE — 99214 OFFICE O/P EST MOD 30 MIN: CPT

## 2023-07-26 RX ORDER — NAPROXEN 500 MG/1
500 TABLET ORAL 3 TIMES DAILY
Refills: 0 | Status: ACTIVE | COMMUNITY
Start: 2022-02-08

## 2023-07-26 RX ORDER — AMLODIPINE BESYLATE 5 MG/1
5 TABLET ORAL DAILY
Refills: 0 | Status: DISCONTINUED | COMMUNITY
Start: 2017-01-24 | End: 2023-07-26

## 2023-07-26 RX ORDER — AMLODIPINE BESYLATE 10 MG/1
10 TABLET ORAL
Qty: 30 | Refills: 5 | Status: ACTIVE | COMMUNITY
Start: 2022-04-26

## 2023-07-26 RX ORDER — UBIDECARENONE/VIT E ACET 100MG-5
25 MCG CAPSULE ORAL
Refills: 0 | Status: ACTIVE | COMMUNITY

## 2023-07-26 NOTE — ASSESSMENT
[Curative] : Goals of care discussed with patient: Curative [FreeTextEntry1] : Ms. Bermeo is a 50 y/o female with stage IA ([id number]) moderately differentiated ER+/CT+/HER2- invasive ductal carcinoma of the left breast s/p bilateral mastectomy, SLNB and ZANDER reconstruction 1/18/16, Oncotype 32, completed adjuvant chemotherapy with TCx4, on tamoxifen (8/1/16-9/2018), switched to anastrozole 9/2018, exemestane 1/2019. Suffered arthralgias 2/2 AI. Tamoxifen restarted 8/2019\par \par - Breast ca: She is tolerating tamoxifen very well without significant side effects. Reports good compliance. Plan to continue tamoxifen for 10 years (8/2026). GYN and opthal f/u annually due to risk of endometrial ca and cataracts respectively.  No breast imaging needed. She has complete 5 yrs of endocrine therapy but we discussed that tamoxifen is typically for 10 yrs. Pt in agreement to continue x 10 yrs\par Patient states evaluated by ophthal 02/2023 with no major issues\par - ANEMIA: Noted to have mild anemia, no GI bleeding. colonoscopy 12/2021- tubular adenoma, cecal. Encouraged GI f/u. Anemia work up negative\par - enlarged ovary/adnexal mass: She is postmenopausal. No PMB. s/p BSO benign path\par -  Mild hot flashes: 2/2 tamoxifen.  Advised to wear layers and use fan prn. Consider Effexor if get worse.\par - Concern for Wt gain 2/2 tamoxifen:Life style modifications, healthy diet and exercise d/w her\par - Patient is aware of signs and symptoms of DVT/PE. Patient will report if she develops acute onset chest pain shortness of breath, leg swelling or calf pain. \par - Continue to followup with primary care for HTN control. ASA 81\par - Low Vitamin D: concern for worsening bone loss due to low vit D. Discussed to increase Vit D intake \par \par RTC 6m

## 2023-07-26 NOTE — PHYSICAL EXAM
[Fully active, able to carry on all pre-disease performance without restriction] : Status 0 - Fully active, able to carry on all pre-disease performance without restriction [Normal] : affect appropriate [de-identified] : BL mastectomies with reconstruction, no chest wall or axillary nodules

## 2023-07-26 NOTE — HISTORY OF PRESENT ILLNESS
[Disease: _____________________] : Disease: [unfilled] [T: ___] : T[unfilled] [N: ___] : N[unfilled] [M: ___] : M[unfilled] [AJCC Stage: ____] : AJCC Stage: [unfilled] [IA] : IA [de-identified] : Ms. Bermeo is a 51 y/o female with stage IA invasive ductal carcinoma of left breast, ER+/DE+/HER2- s/p bilateral mastectomy and adjuvant chemotherapy with TC x4 here for f/u. Her oncologic history is as follows: \par \par She underwent a screening mammography on 10/16/2015 because of a lump she had palpated in the left breast, which revealed two clusters of heterogeneous microcalcifications in the left breast.  This was followed by an ultrasound  which revealed a 0.9 x 1.3 x 0.6cm poorly marginated/microlobulated, heterogeneously hypoechoic nodule in the left breast, directly beneath the region of interest of the palpable lump, 9:00 position 1 cm from the nipple. She underwent a core biopsy of the nodule on 11/4/2015 which revealed DCIS, solid and cribriform patterns, high grade nuclear atypia and focal necrosis. ER 60%, DE 30%. This biopsy was followed by stereotactic core biopsies\par of the two groupings of calcifications in the left breast on 11/6/2015. Biopsy of ''left 9:00 anterior" revealed  DCIS and biopsy of "left 9:00 posterior" revealed grade 2 IDC along with DCIS. ER 95%, DE 95%, HER2 0/1+. ER/DE repeated on DCIS (but unclear if on anterior or posterior block): ER 95%, DE 95%.\par \par Following the biopsies, she had a bilateral breast MRI on 11/19/15.  The left breast revealed a large ~5cm area of nonmass enhancement extending from the upper inner breast, involving the palpable area. Additionally, there was a 5.3cm rim enhancing mass at the superior posterior central left breast likely a combination of post biopsy and hematoma and residual disease. 4 mm focus of enhancement lower outer left breast. The right breast demonstrated a 11 mm heterogeneous focal nonmass enhancement central right breast. \par Bilateral prominent axillary lymph nodes. She underwent biopsies of both axillary LNs on 12/9/15: right LN: fragmented, unremarkable LN. Left LN: unremarkable breast tissue, no lymphoid tissue identified. She also had a MRI guided core biopsy of the right breast on 12/11/15: benign.  \par \par 1/18/16- Bilateral mastectomy (patient preference; genetics neg) and SLNB with ZANDER reconstruction-  \par Right mastectomy: proliferative fibrocystic changes with usual ductal hyperplasia, calcification, biopsy site changes, columnar cell change, sclerosing adenosis and stromal fibrosis, 0/3 nodes\par Left mastectomy: Invasive ductal carcinoma, stage IA ([id number])\par Size: 3 foci, largest 9 mm (others 4mm, 5mm)   stGstrstastdstest:st st1st LNs: Negative (0/3 sentinel LNs, 0/2 internal mammary LNs)  DCIS: Present  Margins: Negative  LVI: absent \par ER 80%, DE 90%, HER2 negative \par Oncotype: 32\par  \par Adjuvant chemotherapy: TC x4 (3/11/16-5/13/16)\par Tamoxifen (8/1/16-)\par \par Her medical history is notable for iron deficiency anemia for which she received IV iron in November 2015.  She had a CT c/a/p, EGD and colonoscopy, all of which were reportedly normal. She states she has had iron deficiency in the past but never to this degree. No clear etiology of iron deficiency so far. 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 Sherice Ellison for evaluation of hypercoagulable state prior to surgery. Hypercoagulable w/u negative but given her history, prophylactic lovenox was recommended for 4 weeks postop.  [de-identified] : Moderately differentiated invasive ductal carcinoma [de-identified] : ER 80%, TN 90%, HER2 negative  [de-identified] : Genetics: CancerNext 32 Gene Panel from [facility]: Negative  [FreeTextEntry1] : Tamoxifen 8/2016- 9/2018; Anastrazole since 9/2018- 1/2019 exemestane 1/2019.  [de-identified] : Ms. BRE GORE is here for a follow up of left breast cancer on endocrine therapy since 8/2016. She tried AI x 2 after menopause but had to stop due to intolerable s/e. \par \par She is tolerating tamoxifen well. Good compliance. She denies mood changes, wt gain, vaginal dryness, SOB, chest pain, leg swelling or clotting issues. Her energy and appetite is good, wt stable. Mild hot flashes are manageable. \par She is postmenopausal. No PMB. GYN - Dr Hansen. Saw 12/2022. s/p BSO 2/2022- benign . Endometrial bx benign\par colonoscopy 12/2021- tubular adenoma, cecal \par Breast imaging: not needed\par Opthal: She has glasses, seeing annually; last visit Feb 2023\par Bone scan  2/2019: negative  for mets \par Dexa scan 5/2023: osteopenia in spine\par She has complete 5 yrs of endocrine therapy but we discussed that tamoxifen is typically for 10 yrs. Pt in agreement to continue x 10 yrs\par She is taking ASA 81\par Anemia has improved.

## 2023-07-26 NOTE — REASON FOR VISIT
[Follow-Up Visit] : a follow-up [Other: _____] : [unfilled] [FreeTextEntry2] : breast cancer ER/MN Positive, HER-2 NEGATIVE

## 2023-07-27 LAB
ALBUMIN SERPL ELPH-MCNC: 4.2 G/DL
ALP BLD-CCNC: 56 U/L
ALT SERPL-CCNC: 14 U/L
ANION GAP SERPL CALC-SCNC: 15 MMOL/L
AST SERPL-CCNC: 18 U/L
BILIRUB SERPL-MCNC: 0.2 MG/DL
BUN SERPL-MCNC: 10 MG/DL
CALCIUM SERPL-MCNC: 9.2 MG/DL
CHLORIDE SERPL-SCNC: 107 MMOL/L
CO2 SERPL-SCNC: 23 MMOL/L
CREAT SERPL-MCNC: 0.89 MG/DL
EGFR: 77 ML/MIN/1.73M2
GLUCOSE SERPL-MCNC: 86 MG/DL
POTASSIUM SERPL-SCNC: 4.2 MMOL/L
PROT SERPL-MCNC: 7.4 G/DL
SODIUM SERPL-SCNC: 144 MMOL/L

## 2023-10-03 NOTE — ASU PATIENT PROFILE, ADULT - NS TRANSFER PATIENT BELONGINGS
Cell Phone/PDA (specify)/Clothing Libtayo Pregnancy And Lactation Text: This medication is contraindicated in pregnancy and when breast feeding.

## 2024-01-24 ENCOUNTER — OUTPATIENT (OUTPATIENT)
Dept: OUTPATIENT SERVICES | Facility: HOSPITAL | Age: 54
LOS: 1 days | Discharge: HOME | End: 2024-01-24

## 2024-01-24 DIAGNOSIS — Z90.13 ACQUIRED ABSENCE OF BILATERAL BREASTS AND NIPPLES: Chronic | ICD-10-CM

## 2024-01-24 DIAGNOSIS — C50.912 MALIGNANT NEOPLASM OF UNSPECIFIED SITE OF LEFT FEMALE BREAST: ICD-10-CM

## 2024-02-09 ENCOUNTER — RESULT REVIEW (OUTPATIENT)
Age: 54
End: 2024-02-09

## 2024-02-09 ENCOUNTER — APPOINTMENT (OUTPATIENT)
Dept: HEMATOLOGY ONCOLOGY | Facility: CLINIC | Age: 54
End: 2024-02-09
Payer: COMMERCIAL

## 2024-02-09 VITALS
SYSTOLIC BLOOD PRESSURE: 116 MMHG | WEIGHT: 177.03 LBS | OXYGEN SATURATION: 98 % | RESPIRATION RATE: 16 BRPM | TEMPERATURE: 97.5 F | BODY MASS INDEX: 29.46 KG/M2 | DIASTOLIC BLOOD PRESSURE: 85 MMHG | HEART RATE: 82 BPM

## 2024-02-09 DIAGNOSIS — D64.9 ANEMIA, UNSPECIFIED: ICD-10-CM

## 2024-02-09 DIAGNOSIS — Z79.810 LONG TERM (CURRENT) USE OF SELECTIVE ESTROGEN RECEPTOR MODULATORS (SERMS): ICD-10-CM

## 2024-02-09 DIAGNOSIS — C50.919 MALIGNANT NEOPLASM OF UNSPECIFIED SITE OF UNSPECIFIED FEMALE BREAST: ICD-10-CM

## 2024-02-09 LAB
BASOPHILS # BLD AUTO: 0.02 K/UL — SIGNIFICANT CHANGE UP (ref 0–0.2)
BASOPHILS NFR BLD AUTO: 0.3 % — SIGNIFICANT CHANGE UP (ref 0–2)
EOSINOPHIL # BLD AUTO: 0.07 K/UL — SIGNIFICANT CHANGE UP (ref 0–0.5)
EOSINOPHIL NFR BLD AUTO: 1.2 % — SIGNIFICANT CHANGE UP (ref 0–6)
HCT VFR BLD CALC: 39.1 % — SIGNIFICANT CHANGE UP (ref 34.5–45)
HGB BLD-MCNC: 12.6 G/DL — SIGNIFICANT CHANGE UP (ref 11.5–15.5)
IMM GRANULOCYTES NFR BLD AUTO: 0.5 % — SIGNIFICANT CHANGE UP (ref 0–0.9)
LYMPHOCYTES # BLD AUTO: 2.27 K/UL — SIGNIFICANT CHANGE UP (ref 1–3.3)
LYMPHOCYTES # BLD AUTO: 39.5 % — SIGNIFICANT CHANGE UP (ref 13–44)
MCHC RBC-ENTMCNC: 24.6 PG — LOW (ref 27–34)
MCHC RBC-ENTMCNC: 32.2 G/DL — SIGNIFICANT CHANGE UP (ref 32–36)
MCV RBC AUTO: 76.4 FL — LOW (ref 80–100)
MONOCYTES # BLD AUTO: 0.47 K/UL — SIGNIFICANT CHANGE UP (ref 0–0.9)
MONOCYTES NFR BLD AUTO: 8.2 % — SIGNIFICANT CHANGE UP (ref 2–14)
NEUTROPHILS # BLD AUTO: 2.88 K/UL — SIGNIFICANT CHANGE UP (ref 1.8–7.4)
NEUTROPHILS NFR BLD AUTO: 50.3 % — SIGNIFICANT CHANGE UP (ref 43–77)
NRBC # BLD: 0 /100 WBCS — SIGNIFICANT CHANGE UP (ref 0–0)
PLATELET # BLD AUTO: 265 K/UL — SIGNIFICANT CHANGE UP (ref 150–400)
RBC # BLD: 5.12 M/UL — SIGNIFICANT CHANGE UP (ref 3.8–5.2)
RBC # FLD: 14.3 % — SIGNIFICANT CHANGE UP (ref 10.3–14.5)
WBC # BLD: 5.74 K/UL — SIGNIFICANT CHANGE UP (ref 3.8–10.5)
WBC # FLD AUTO: 5.74 K/UL — SIGNIFICANT CHANGE UP (ref 3.8–10.5)

## 2024-02-09 PROCEDURE — 99214 OFFICE O/P EST MOD 30 MIN: CPT

## 2024-02-09 RX ORDER — TAMOXIFEN CITRATE 20 MG/1
20 TABLET, FILM COATED ORAL DAILY
Qty: 90 | Refills: 1 | Status: ACTIVE | COMMUNITY
Start: 2019-08-16 | End: 1900-01-01

## 2024-02-09 RX ORDER — ASPIRIN 81 MG/1
81 TABLET, CHEWABLE ORAL DAILY
Qty: 90 | Refills: 1 | Status: ACTIVE | COMMUNITY
Start: 2022-07-18 | End: 1900-01-01

## 2024-02-11 PROBLEM — D64.9 ANEMIA: Status: ACTIVE | Noted: 2021-09-15

## 2024-02-11 PROBLEM — Z79.810 CARE RELATED TO CURRENT TAMOXIFEN USE: Status: ACTIVE | Noted: 2017-09-08

## 2024-02-11 NOTE — HISTORY OF PRESENT ILLNESS
[Disease: _____________________] : Disease: [unfilled] [T: ___] : T[unfilled] [N: ___] : N[unfilled] [M: ___] : M[unfilled] [AJCC Stage: ____] : AJCC Stage: [unfilled] [IA] : IA [de-identified] : Ms. Bermeo is a 51 y/o female with stage IA invasive ductal carcinoma of left breast, ER+/MO+/HER2- s/p bilateral mastectomy and adjuvant chemotherapy with TC x4 here for f/u. Her oncologic history is as follows:   She underwent a screening mammography on 10/16/2015 because of a lump she had palpated in the left breast, which revealed two clusters of heterogeneous microcalcifications in the left breast.  This was followed by an ultrasound  which revealed a 0.9 x 1.3 x 0.6cm poorly marginated/microlobulated, heterogeneously hypoechoic nodule in the left breast, directly beneath the region of interest of the palpable lump, 9:00 position 1 cm from the nipple. She underwent a core biopsy of the nodule on 11/4/2015 which revealed DCIS, solid and cribriform patterns, high grade nuclear atypia and focal necrosis. ER 60%, MO 30%. This biopsy was followed by stereotactic core biopsies of the two groupings of calcifications in the left breast on 11/6/2015. Biopsy of ''left 9:00 anterior" revealed  DCIS and biopsy of "left 9:00 posterior" revealed grade 2 IDC along with DCIS. ER 95%, MO 95%, HER2 0/1+. ER/MO repeated on DCIS (but unclear if on anterior or posterior block): ER 95%, MO 95%.  Following the biopsies, she had a bilateral breast MRI on 11/19/15.  The left breast revealed a large ~5cm area of nonmass enhancement extending from the upper inner breast, involving the palpable area. Additionally, there was a 5.3cm rim enhancing mass at the superior posterior central left breast likely a combination of post biopsy and hematoma and residual disease. 4 mm focus of enhancement lower outer left breast. The right breast demonstrated a 11 mm heterogeneous focal nonmass enhancement central right breast.  Bilateral prominent axillary lymph nodes. She underwent biopsies of both axillary LNs on 12/9/15: right LN: fragmented, unremarkable LN. Left LN: unremarkable breast tissue, no lymphoid tissue identified. She also had a MRI guided core biopsy of the right breast on 12/11/15: benign.    1/18/16- Bilateral mastectomy (patient preference; genetics neg) and SLNB with ZANDER reconstruction-   Right mastectomy: proliferative fibrocystic changes with usual ductal hyperplasia, calcification, biopsy site changes, columnar cell change, sclerosing adenosis and stromal fibrosis, 0/3 nodes Left mastectomy: Invasive ductal carcinoma, stage IA ([id number]) Size: 3 foci, largest 9 mm (others 4mm, 5mm)   stGstrstastdstest:st st1st LNs: Negative (0/3 sentinel LNs, 0/2 internal mammary LNs)  DCIS: Present  Margins: Negative  LVI: absent  ER 80%, MO 90%, HER2 negative  Oncotype: 32   Adjuvant chemotherapy: TC x4 (3/11/16-5/13/16) Tamoxifen (8/1/16-)  Her medical history is notable for iron deficiency anemia for which she received IV iron in November 2015.  She had a CT c/a/p, EGD and colonoscopy, all of which were reportedly normal. She states she has had iron deficiency in the past but never to this degree. No clear etiology of iron deficiency so far. 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 Sherice Ellison for evaluation of hypercoagulable state prior to surgery. Hypercoagulable w/u negative but given her history, prophylactic lovenox was recommended for 4 weeks postop.  [de-identified] : Moderately differentiated invasive ductal carcinoma [de-identified] : ER 80%, DC 90%, HER2 negative  [de-identified] : Genetics: CancerNext 32 Gene Panel from [facility]: Negative  [FreeTextEntry1] : Tamoxifen 8/2016- 9/2018; Anastrazole since 9/2018- 1/2019 exemestane 1/2019.  [de-identified] : Ms. BRE GORE is here for a follow up of left breast cancer on endocrine therapy since 8/2016. She tried AI x 2 after menopause but had to stop due to intolerable s/e.   She is tolerating tamoxifen well. Good compliance. She denies mood changes, wt gain, vaginal dryness, SOB, chest pain, leg swelling or clotting issues. Her energy and appetite is good, wt stable. Mild hot flashes are manageable.  She is postmenopausal. No PMB. GYN - Dr Hansen. Saw 12/2022. s/p BSO 2/2022- benign . Endometrial bx benign colonoscopy 12/2021- tubular adenoma, cecal  Breast imaging: not needed Opthal: She has glasses, seeing annually; last visit Feb 2023 Bone scan  2/2019: negative for mets  Dexa scan 5/2023: osteopenia in spine She has complete 5 yrs of endocrine therapy but we discussed that tamoxifen is typically for 10 yrs. Pt in agreement to continue x 10 yrs She is taking ASA 81 Anemia has improved.

## 2024-02-11 NOTE — ASSESSMENT
[Curative] : Goals of care discussed with patient: Curative [FreeTextEntry1] : Ms. Bermeo is a 50 y/o female with stage IA ([id number]) moderately differentiated ER+/OR+/HER2- invasive ductal carcinoma of the left breast s/p bilateral mastectomy, SLNB and ZANDER reconstruction 1/18/16, Oncotype 32, completed adjuvant chemotherapy with TCx4, on tamoxifen (8/1/16-9/2018), switched to anastrozole 9/2018, exemestane 1/2019. Suffered arthralgias 2/2 AI. Tamoxifen restarted 8/2019  - Breast ca: She is tolerating tamoxifen very well without significant side effects. Reports good compliance. Plan to continue tamoxifen for 10 years (8/2026). GYN and opthal f/u annually due to risk of endometrial ca and cataracts respectively.  No breast imaging needed. She has complete 5 yrs of endocrine therapy but we discussed that tamoxifen is typically for 10 yrs. Pt in agreement to continue x 10 yrs. No new mass detected.    - ANEMIA: Noted to have mild anemia, no GI bleeding. colonoscopy 12/2021- tubular adenoma, cecal. Encouraged GI f/u. Patient always has low MCV. Will order CBC, iron panel with ferritin and Hb electrophoresis to work up anemia today  - Enlarged ovary/adnexal mass: She is postmenopausal. No PMB. s/p BSO benign path -  Mild hot flashes: 2/2 tamoxifen.  Advised to wear layers and use fan prn. Consider Effexor if get worse. - Concern for Wt gain 2/2 tamoxifen: Life style modifications, healthy diet and exercise d/w her - Patient is aware of signs and symptoms of DVT/PE. Patient will report if she develops acute onset chest pain shortness of breath, leg swelling or calf pain.  - Continue to followup with primary care for HTN control. ASA 81. - Low Vitamin D: concern for worsening bone loss due to low vit D. Discussed to increase Vit D intake  RTC 6m

## 2024-02-11 NOTE — PHYSICAL EXAM
[Fully active, able to carry on all pre-disease performance without restriction] : Status 0 - Fully active, able to carry on all pre-disease performance without restriction [Normal] : affect appropriate [de-identified] : BL mastectomies with reconstruction, no chest wall or axillary nodules

## 2024-02-13 ENCOUNTER — NON-APPOINTMENT (OUTPATIENT)
Age: 54
End: 2024-02-13

## 2024-02-13 LAB
ALBUMIN SERPL ELPH-MCNC: 4.6 G/DL
ALP BLD-CCNC: 66 U/L
ALT SERPL-CCNC: 14 U/L
ANION GAP SERPL CALC-SCNC: 10 MMOL/L
AST SERPL-CCNC: 18 U/L
BILIRUB SERPL-MCNC: 0.2 MG/DL
BUN SERPL-MCNC: 10 MG/DL
CALCIUM SERPL-MCNC: 9.5 MG/DL
CHLORIDE SERPL-SCNC: 107 MMOL/L
CO2 SERPL-SCNC: 25 MMOL/L
CREAT SERPL-MCNC: 0.76 MG/DL
EGFR: 92 ML/MIN/1.73M2
FERRITIN SERPL-MCNC: 58 NG/ML
GLUCOSE SERPL-MCNC: 87 MG/DL
HGB A MFR BLD: 58 %
HGB A2 MFR BLD: 3.1 %
HGB FRACT BLD-IMP: NORMAL
HGB S MFR BLD: 38.9 %
IRON SATN MFR SERPL: 18 %
IRON SERPL-MCNC: 70 UG/DL
POTASSIUM SERPL-SCNC: 4.4 MMOL/L
PROT SERPL-MCNC: 8.4 G/DL
SODIUM SERPL-SCNC: 142 MMOL/L
TIBC SERPL-MCNC: 395 UG/DL
UIBC SERPL-MCNC: 325 UG/DL

## 2024-08-14 ENCOUNTER — OUTPATIENT (OUTPATIENT)
Dept: OUTPATIENT SERVICES | Facility: HOSPITAL | Age: 54
LOS: 1 days | Discharge: HOME | End: 2024-08-14

## 2024-08-14 DIAGNOSIS — Z90.13 ACQUIRED ABSENCE OF BILATERAL BREASTS AND NIPPLES: Chronic | ICD-10-CM

## 2024-08-14 DIAGNOSIS — C50.912 MALIGNANT NEOPLASM OF UNSPECIFIED SITE OF LEFT FEMALE BREAST: ICD-10-CM

## 2024-08-26 ENCOUNTER — APPOINTMENT (OUTPATIENT)
Dept: HEMATOLOGY ONCOLOGY | Facility: CLINIC | Age: 54
End: 2024-08-26

## 2024-08-28 ENCOUNTER — APPOINTMENT (OUTPATIENT)
Dept: OBGYN | Facility: CLINIC | Age: 54
End: 2024-08-28
Payer: COMMERCIAL

## 2024-08-28 VITALS
WEIGHT: 173 LBS | HEIGHT: 65 IN | DIASTOLIC BLOOD PRESSURE: 91 MMHG | BODY MASS INDEX: 28.82 KG/M2 | HEART RATE: 76 BPM | SYSTOLIC BLOOD PRESSURE: 144 MMHG

## 2024-08-28 DIAGNOSIS — N95.2 POSTMENOPAUSAL ATROPHIC VAGINITIS: ICD-10-CM

## 2024-08-28 DIAGNOSIS — I10 ESSENTIAL (PRIMARY) HYPERTENSION: ICD-10-CM

## 2024-08-28 DIAGNOSIS — C50.919 MALIGNANT NEOPLASM OF UNSPECIFIED SITE OF UNSPECIFIED FEMALE BREAST: ICD-10-CM

## 2024-08-28 DIAGNOSIS — Z01.419 ENCOUNTER FOR GYNECOLOGICAL EXAMINATION (GENERAL) (ROUTINE) W/OUT ABNORMAL FINDINGS: ICD-10-CM

## 2024-08-28 PROCEDURE — 99459 PELVIC EXAMINATION: CPT

## 2024-08-28 PROCEDURE — 99396 PREV VISIT EST AGE 40-64: CPT

## 2024-08-28 NOTE — HISTORY OF PRESENT ILLNESS
[FreeTextEntry1] : 54 y.o. P 5264  postmenopausal female, presents for annual exam, pt feels well  PMH-  Breast ca - Tamoxifen 20mg,   , HTN-  Amlodipine 10mg  PCP Parish Reyes [place]  PSHx- bilateral mastectomy,  in Jan. 2016,  FH-  HTN- mother,  SH- negative, GYN hx - BTL, ,   laparoscopic BSO- Dr. Mckinney OB hx - ( see parity).  Kamryn Ellison - Oncologist.  Last colonoscopy, Dec. 15 2021, tubular adenoma pt is due now .  Last Dexa May 2023 Osteopenia,  f/u 2025  Last pap April 2023, next due in April 2026

## 2024-08-28 NOTE — DISCUSSION/SUMMARY
[FreeTextEntry1] : A - WWV      Breast ca      HTN      postmenopausal atrophic vaginitis     Osteopenia   P f/u 1 year      next Dexa May 2025     referral for colorectal screening f/u given      pap next due 2026     exercise encouraged      nutritional counseling provided.      continue Tamoxifen for 2 more years.

## 2024-08-28 NOTE — PHYSICAL EXAM
[Chaperone Present] : A chaperone was present in the examining room during all aspects of the physical examination [[id number]] : A chaperone was present during the pelvic exam. [Labia Majora] : normal [Labia Minora] : normal [Atrophy] : atrophy [Normal] : normal [Retroversion] : retroverted [Uterine Adnexae] : absent [FreeTextEntry2] : EffiePhilogene

## 2024-10-03 ENCOUNTER — NON-APPOINTMENT (OUTPATIENT)
Age: 54
End: 2024-10-03

## 2024-10-04 ENCOUNTER — APPOINTMENT (OUTPATIENT)
Dept: HEMATOLOGY ONCOLOGY | Facility: CLINIC | Age: 54
End: 2024-10-04
Payer: COMMERCIAL

## 2024-10-04 VITALS
RESPIRATION RATE: 16 BRPM | WEIGHT: 176.37 LBS | DIASTOLIC BLOOD PRESSURE: 89 MMHG | TEMPERATURE: 99.1 F | SYSTOLIC BLOOD PRESSURE: 144 MMHG | BODY MASS INDEX: 29.35 KG/M2 | OXYGEN SATURATION: 98 % | HEART RATE: 74 BPM

## 2024-10-04 DIAGNOSIS — C50.919 MALIGNANT NEOPLASM OF UNSPECIFIED SITE OF UNSPECIFIED FEMALE BREAST: ICD-10-CM

## 2024-10-04 DIAGNOSIS — Z79.810 LONG TERM (CURRENT) USE OF SELECTIVE ESTROGEN RECEPTOR MODULATORS (SERMS): ICD-10-CM

## 2024-10-04 DIAGNOSIS — R23.2 FLUSHING: ICD-10-CM

## 2024-10-04 PROCEDURE — G2211 COMPLEX E/M VISIT ADD ON: CPT | Mod: NC

## 2024-10-04 PROCEDURE — 99214 OFFICE O/P EST MOD 30 MIN: CPT

## 2024-10-04 NOTE — REASON FOR VISIT
[Follow-Up Visit] : a follow-up [Other: _____] : [unfilled] [FreeTextEntry2] : breast cancer ER/LA Positive, HER-2 NEGATIVE

## 2024-10-04 NOTE — HISTORY OF PRESENT ILLNESS
[Disease: _____________________] : Disease: [unfilled] [T: ___] : T[unfilled] [N: ___] : N[unfilled] [M: ___] : M[unfilled] [AJCC Stage: ____] : AJCC Stage: [unfilled] [de-identified] : Ms. Bermeo is a 53 y/o female with stage IA invasive ductal carcinoma of left breast, ER+/SD+/HER2- s/p bilateral mastectomy and adjuvant chemotherapy with TC x4 here for f/u. Her oncologic history is as follows:   She underwent a screening mammography on 10/16/2015 because of a lump she had palpated in the left breast, which revealed two clusters of heterogeneous microcalcifications in the left breast.  This was followed by an ultrasound  which revealed a 0.9 x 1.3 x 0.6cm poorly marginated/microlobulated, heterogeneously hypoechoic nodule in the left breast, directly beneath the region of interest of the palpable lump, 9:00 position 1 cm from the nipple. She underwent a core biopsy of the nodule on 11/4/2015 which revealed DCIS, solid and cribriform patterns, high grade nuclear atypia and focal necrosis. ER 60%, SD 30%. This biopsy was followed by stereotactic core biopsies of the two groupings of calcifications in the left breast on 11/6/2015. Biopsy of ''left 9:00 anterior" revealed  DCIS and biopsy of "left 9:00 posterior" revealed grade 2 IDC along with DCIS. ER 95%, SD 95%, HER2 0/1+. ER/SD repeated on DCIS (but unclear if on anterior or posterior block): ER 95%, SD 95%.  Following the biopsies, she had a bilateral breast MRI on 11/19/15.  The left breast revealed a large ~5cm area of nonmass enhancement extending from the upper inner breast, involving the palpable area. Additionally, there was a 5.3cm rim enhancing mass at the superior posterior central left breast likely a combination of post biopsy and hematoma and residual disease. 4 mm focus of enhancement lower outer left breast. The right breast demonstrated a 11 mm heterogeneous focal nonmass enhancement central right breast.  Bilateral prominent axillary lymph nodes. She underwent biopsies of both axillary LNs on 12/9/15: right LN: fragmented, unremarkable LN. Left LN: unremarkable breast tissue, no lymphoid tissue identified. She also had a MRI guided core biopsy of the right breast on 12/11/15: benign.    1/18/16- Bilateral mastectomy (patient preference; genetics neg) and SLNB with ZANDER reconstruction-   Right mastectomy: proliferative fibrocystic changes with usual ductal hyperplasia, calcification, biopsy site changes, columnar cell change, sclerosing adenosis and stromal fibrosis, 0/3 nodes Left mastectomy: Invasive ductal carcinoma, stage IA ([id number]) Size: 3 foci, largest 9 mm (others 4mm, 5mm)   stGstrstastdstest:st st1st LNs: Negative (0/3 sentinel LNs, 0/2 internal mammary LNs)  DCIS: Present  Margins: Negative  LVI: absent  ER 80%, SD 90%, HER2 negative  Oncotype: 32   Adjuvant chemotherapy: TC x4 (3/11/16-5/13/16) Tamoxifen (8/1/16-)  Her medical history is notable for iron deficiency anemia for which she received IV iron in November 2015.  She had a CT c/a/p, EGD and colonoscopy, all of which were reportedly normal. She states she has had iron deficiency in the past but never to this degree. No clear etiology of iron deficiency so far. 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 Sherice Ellison for evaluation of hypercoagulable state prior to surgery. Hypercoagulable w/u negative but given her history, prophylactic lovenox was recommended for 4 weeks postop.  [de-identified] : Moderately differentiated invasive ductal carcinoma [de-identified] : ER 80%, WY 90%, HER2 negative  [de-identified] : Genetics: CancerNext 32 Gene Panel from [facility]: Negative  [FreeTextEntry1] : Tamoxifen 8/2016- 9/2018; Anastrazole since 9/2018- 1/2019 exemestane 1/2019.  [de-identified] : Ms. BRE GORE is here for a follow up of left breast cancer on endocrine therapy since 8/2016. She tried AI x 2 after menopause but had to stop due to intolerable s/e.   She is tolerating tamoxifen well. Good compliance. She denies mood changes, wt gain, vaginal dryness, SOB, chest pain, leg swelling or clotting issues. Her energy and appetite is good, wt stable. Mild hot flashes are manageable.  She is postmenopausal. No PMB. GYN - Dr Hansen. Saw 8/2024 s/p BSO 2/2022- benign . Endometrial bx benign colonoscopy 12/2021- tubular adenoma, cecal  Breast imaging: not needed Opthal: She has glasses, seeing annually; last visit Feb 2024 Bone scan  2/2019: negative for mets  Dexa scan 5/2023: osteopenia in spine She has complete 5 yrs of endocrine therapy but we discussed that tamoxifen is typically for 10 yrs. Pt in agreement to continue x 10 yrs She is taking ASA 81 Anemia has improved. [IA] : IA

## 2024-10-04 NOTE — ASSESSMENT
[FreeTextEntry1] : Ms. Bermeo is a 50 y/o female with stage IA ([id number]) moderately differentiated ER+/WI+/HER2- invasive ductal carcinoma of the left breast s/p bilateral mastectomy, SLNB and ZANDER reconstruction 1/18/16, Oncotype 32, completed adjuvant chemotherapy with TCx4, on tamoxifen (8/1/16-9/2018), switched to anastrozole 9/2018, exemestane 1/2019. Suffered arthralgias 2/2 AI. Tamoxifen restarted 8/2019  - Breast ca: She is tolerating tamoxifen very well without significant side effects. Reports good compliance. Plan to continue tamoxifen for 10 years (8/2026). GYN and opthal f/u annually due to risk of endometrial ca and cataracts respectively.  No breast imaging needed. She has complete 5 yrs of endocrine therapy but we discussed that tamoxifen is typically for 10 yrs ( 8/2026) . Pt in agreement to continue x 10 yrs. No new mass detected on exam   - ANEMIA: Noted to have mild anemia, no GI bleeding. colonoscopy 12/2021- tubular adenoma, cecal. Encouraged GI f/u. Patient always has low MCV. Will order CBC, iron panel with ferritin and Hb electrophoresis to work up anemia today  - Enlarged ovary/adnexal mass: She is postmenopausal. No PMB. s/p BSO benign path -  Mild hot flashes: 2/2 tamoxifen.  Advised to wear layers and use fan prn. Consider Effexor if get worse. - Concern for Wt gain 2/2 tamoxifen: Life style modifications, healthy diet and exercise d/w her - Patient is aware of signs and symptoms of DVT/PE. Patient will report if she develops acute onset chest pain shortness of breath, leg swelling or calf pain.  - Continue to followup with primary care for HTN control. ASA 81. - Low Vitamin D: concern for worsening bone loss due to low vit D. Discussed to increase Vit D intake  RTC 6m  [Curative] : Goals of care discussed with patient: Curative

## 2024-10-04 NOTE — PHYSICAL EXAM
[Fully active, able to carry on all pre-disease performance without restriction] : Status 0 - Fully active, able to carry on all pre-disease performance without restriction [Normal] : affect appropriate [de-identified] : BL mastectomies with reconstruction, no chest wall or axillary nodules

## 2024-10-04 NOTE — REVIEW OF SYSTEMS
[Recent Change In Weight] : ~T no recent weight change [Hot Flashes] : hot flashes [Negative] : Allergic/Immunologic

## 2024-10-04 NOTE — PHYSICAL EXAM
[Fully active, able to carry on all pre-disease performance without restriction] : Status 0 - Fully active, able to carry on all pre-disease performance without restriction [Normal] : affect appropriate [de-identified] : BL mastectomies with reconstruction, no chest wall or axillary nodules

## 2024-10-04 NOTE — ASSESSMENT
[FreeTextEntry1] : Ms. Bermeo is a 50 y/o female with stage IA ([id number]) moderately differentiated ER+/SC+/HER2- invasive ductal carcinoma of the left breast s/p bilateral mastectomy, SLNB and ZANDER reconstruction 1/18/16, Oncotype 32, completed adjuvant chemotherapy with TCx4, on tamoxifen (8/1/16-9/2018), switched to anastrozole 9/2018, exemestane 1/2019. Suffered arthralgias 2/2 AI. Tamoxifen restarted 8/2019  - Breast ca: She is tolerating tamoxifen very well without significant side effects. Reports good compliance. Plan to continue tamoxifen for 10 years (8/2026). GYN and opthal f/u annually due to risk of endometrial ca and cataracts respectively.  No breast imaging needed. She has complete 5 yrs of endocrine therapy but we discussed that tamoxifen is typically for 10 yrs ( 8/2026) . Pt in agreement to continue x 10 yrs. No new mass detected on exam   - ANEMIA: Noted to have mild anemia, no GI bleeding. colonoscopy 12/2021- tubular adenoma, cecal. Encouraged GI f/u. Patient always has low MCV. Will order CBC, iron panel with ferritin and Hb electrophoresis to work up anemia today  - Enlarged ovary/adnexal mass: She is postmenopausal. No PMB. s/p BSO benign path -  Mild hot flashes: 2/2 tamoxifen.  Advised to wear layers and use fan prn. Consider Effexor if get worse. - Concern for Wt gain 2/2 tamoxifen: Life style modifications, healthy diet and exercise d/w her - Patient is aware of signs and symptoms of DVT/PE. Patient will report if she develops acute onset chest pain shortness of breath, leg swelling or calf pain.  - Continue to followup with primary care for HTN control. ASA 81. - Low Vitamin D: concern for worsening bone loss due to low vit D. Discussed to increase Vit D intake  RTC 6m  [Curative] : Goals of care discussed with patient: Curative

## 2024-10-04 NOTE — HISTORY OF PRESENT ILLNESS
[Disease: _____________________] : Disease: [unfilled] [T: ___] : T[unfilled] [N: ___] : N[unfilled] [M: ___] : M[unfilled] [AJCC Stage: ____] : AJCC Stage: [unfilled] [de-identified] : Ms. Bermeo is a 53 y/o female with stage IA invasive ductal carcinoma of left breast, ER+/VT+/HER2- s/p bilateral mastectomy and adjuvant chemotherapy with TC x4 here for f/u. Her oncologic history is as follows:   She underwent a screening mammography on 10/16/2015 because of a lump she had palpated in the left breast, which revealed two clusters of heterogeneous microcalcifications in the left breast.  This was followed by an ultrasound  which revealed a 0.9 x 1.3 x 0.6cm poorly marginated/microlobulated, heterogeneously hypoechoic nodule in the left breast, directly beneath the region of interest of the palpable lump, 9:00 position 1 cm from the nipple. She underwent a core biopsy of the nodule on 11/4/2015 which revealed DCIS, solid and cribriform patterns, high grade nuclear atypia and focal necrosis. ER 60%, VT 30%. This biopsy was followed by stereotactic core biopsies of the two groupings of calcifications in the left breast on 11/6/2015. Biopsy of ''left 9:00 anterior" revealed  DCIS and biopsy of "left 9:00 posterior" revealed grade 2 IDC along with DCIS. ER 95%, VT 95%, HER2 0/1+. ER/VT repeated on DCIS (but unclear if on anterior or posterior block): ER 95%, VT 95%.  Following the biopsies, she had a bilateral breast MRI on 11/19/15.  The left breast revealed a large ~5cm area of nonmass enhancement extending from the upper inner breast, involving the palpable area. Additionally, there was a 5.3cm rim enhancing mass at the superior posterior central left breast likely a combination of post biopsy and hematoma and residual disease. 4 mm focus of enhancement lower outer left breast. The right breast demonstrated a 11 mm heterogeneous focal nonmass enhancement central right breast.  Bilateral prominent axillary lymph nodes. She underwent biopsies of both axillary LNs on 12/9/15: right LN: fragmented, unremarkable LN. Left LN: unremarkable breast tissue, no lymphoid tissue identified. She also had a MRI guided core biopsy of the right breast on 12/11/15: benign.    1/18/16- Bilateral mastectomy (patient preference; genetics neg) and SLNB with ZANDER reconstruction-   Right mastectomy: proliferative fibrocystic changes with usual ductal hyperplasia, calcification, biopsy site changes, columnar cell change, sclerosing adenosis and stromal fibrosis, 0/3 nodes Left mastectomy: Invasive ductal carcinoma, stage IA ([id number]) Size: 3 foci, largest 9 mm (others 4mm, 5mm)   rdGrdrrdarddrderd:rd rd3rd LNs: Negative (0/3 sentinel LNs, 0/2 internal mammary LNs)  DCIS: Present  Margins: Negative  LVI: absent  ER 80%, VT 90%, HER2 negative  Oncotype: 32   Adjuvant chemotherapy: TC x4 (3/11/16-5/13/16) Tamoxifen (8/1/16-)  Her medical history is notable for iron deficiency anemia for which she received IV iron in November 2015.  She had a CT c/a/p, EGD and colonoscopy, all of which were reportedly normal. She states she has had iron deficiency in the past but never to this degree. No clear etiology of iron deficiency so far. 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 Sherice Ellison for evaluation of hypercoagulable state prior to surgery. Hypercoagulable w/u negative but given her history, prophylactic lovenox was recommended for 4 weeks postop.  [de-identified] : Moderately differentiated invasive ductal carcinoma [de-identified] : ER 80%, CO 90%, HER2 negative  [de-identified] : Genetics: CancerNext 32 Gene Panel from [facility]: Negative  [FreeTextEntry1] : Tamoxifen 8/2016- 9/2018; Anastrazole since 9/2018- 1/2019 exemestane 1/2019.  [de-identified] : Ms. BRE GORE is here for a follow up of left breast cancer on endocrine therapy since 8/2016. She tried AI x 2 after menopause but had to stop due to intolerable s/e.   She is tolerating tamoxifen well. Good compliance. She denies mood changes, wt gain, vaginal dryness, SOB, chest pain, leg swelling or clotting issues. Her energy and appetite is good, wt stable. Mild hot flashes are manageable.  She is postmenopausal. No PMB. GYN - Dr Hansen. Saw 8/2024 s/p BSO 2/2022- benign . Endometrial bx benign colonoscopy 12/2021- tubular adenoma, cecal  Breast imaging: not needed Opthal: She has glasses, seeing annually; last visit Feb 2024 Bone scan  2/2019: negative for mets  Dexa scan 5/2023: osteopenia in spine She has complete 5 yrs of endocrine therapy but we discussed that tamoxifen is typically for 10 yrs. Pt in agreement to continue x 10 yrs She is taking ASA 81 Anemia has improved. [IA] : IA

## 2024-10-04 NOTE — ASSESSMENT
[FreeTextEntry1] : Ms. Bermeo is a 50 y/o female with stage IA ([id number]) moderately differentiated ER+/WY+/HER2- invasive ductal carcinoma of the left breast s/p bilateral mastectomy, SLNB and ZANDER reconstruction 1/18/16, Oncotype 32, completed adjuvant chemotherapy with TCx4, on tamoxifen (8/1/16-9/2018), switched to anastrozole 9/2018, exemestane 1/2019. Suffered arthralgias 2/2 AI. Tamoxifen restarted 8/2019  - Breast ca: She is tolerating tamoxifen very well without significant side effects. Reports good compliance. Plan to continue tamoxifen for 10 years (8/2026). GYN and opthal f/u annually due to risk of endometrial ca and cataracts respectively.  No breast imaging needed. She has complete 5 yrs of endocrine therapy but we discussed that tamoxifen is typically for 10 yrs ( 8/2026) . Pt in agreement to continue x 10 yrs. No new mass detected on exam   - ANEMIA: Noted to have mild anemia, no GI bleeding. colonoscopy 12/2021- tubular adenoma, cecal. Encouraged GI f/u. Patient always has low MCV. Will order CBC, iron panel with ferritin and Hb electrophoresis to work up anemia today  - Enlarged ovary/adnexal mass: She is postmenopausal. No PMB. s/p BSO benign path -  Mild hot flashes: 2/2 tamoxifen.  Advised to wear layers and use fan prn. Consider Effexor if get worse. - Concern for Wt gain 2/2 tamoxifen: Life style modifications, healthy diet and exercise d/w her - Patient is aware of signs and symptoms of DVT/PE. Patient will report if she develops acute onset chest pain shortness of breath, leg swelling or calf pain.  - Continue to followup with primary care for HTN control. ASA 81. - Low Vitamin D: concern for worsening bone loss due to low vit D. Discussed to increase Vit D intake  RTC 6m  [Curative] : Goals of care discussed with patient: Curative

## 2024-10-04 NOTE — REASON FOR VISIT
[Follow-Up Visit] : a follow-up [Other: _____] : [unfilled] [FreeTextEntry2] : breast cancer ER/KS Positive, HER-2 NEGATIVE

## 2024-10-04 NOTE — REASON FOR VISIT
[Follow-Up Visit] : a follow-up [Other: _____] : [unfilled] [FreeTextEntry2] : breast cancer ER/FL Positive, HER-2 NEGATIVE

## 2024-10-04 NOTE — HISTORY OF PRESENT ILLNESS
[Disease: _____________________] : Disease: [unfilled] [T: ___] : T[unfilled] [N: ___] : N[unfilled] [M: ___] : M[unfilled] [AJCC Stage: ____] : AJCC Stage: [unfilled] [de-identified] : Ms. Bermeo is a 51 y/o female with stage IA invasive ductal carcinoma of left breast, ER+/WA+/HER2- s/p bilateral mastectomy and adjuvant chemotherapy with TC x4 here for f/u. Her oncologic history is as follows:   She underwent a screening mammography on 10/16/2015 because of a lump she had palpated in the left breast, which revealed two clusters of heterogeneous microcalcifications in the left breast.  This was followed by an ultrasound  which revealed a 0.9 x 1.3 x 0.6cm poorly marginated/microlobulated, heterogeneously hypoechoic nodule in the left breast, directly beneath the region of interest of the palpable lump, 9:00 position 1 cm from the nipple. She underwent a core biopsy of the nodule on 11/4/2015 which revealed DCIS, solid and cribriform patterns, high grade nuclear atypia and focal necrosis. ER 60%, WA 30%. This biopsy was followed by stereotactic core biopsies of the two groupings of calcifications in the left breast on 11/6/2015. Biopsy of ''left 9:00 anterior" revealed  DCIS and biopsy of "left 9:00 posterior" revealed grade 2 IDC along with DCIS. ER 95%, WA 95%, HER2 0/1+. ER/WA repeated on DCIS (but unclear if on anterior or posterior block): ER 95%, WA 95%.  Following the biopsies, she had a bilateral breast MRI on 11/19/15.  The left breast revealed a large ~5cm area of nonmass enhancement extending from the upper inner breast, involving the palpable area. Additionally, there was a 5.3cm rim enhancing mass at the superior posterior central left breast likely a combination of post biopsy and hematoma and residual disease. 4 mm focus of enhancement lower outer left breast. The right breast demonstrated a 11 mm heterogeneous focal nonmass enhancement central right breast.  Bilateral prominent axillary lymph nodes. She underwent biopsies of both axillary LNs on 12/9/15: right LN: fragmented, unremarkable LN. Left LN: unremarkable breast tissue, no lymphoid tissue identified. She also had a MRI guided core biopsy of the right breast on 12/11/15: benign.    1/18/16- Bilateral mastectomy (patient preference; genetics neg) and SLNB with ZANDER reconstruction-   Right mastectomy: proliferative fibrocystic changes with usual ductal hyperplasia, calcification, biopsy site changes, columnar cell change, sclerosing adenosis and stromal fibrosis, 0/3 nodes Left mastectomy: Invasive ductal carcinoma, stage IA ([id number]) Size: 3 foci, largest 9 mm (others 4mm, 5mm)   stGstrstastdstest:st st1st LNs: Negative (0/3 sentinel LNs, 0/2 internal mammary LNs)  DCIS: Present  Margins: Negative  LVI: absent  ER 80%, WA 90%, HER2 negative  Oncotype: 32   Adjuvant chemotherapy: TC x4 (3/11/16-5/13/16) Tamoxifen (8/1/16-)  Her medical history is notable for iron deficiency anemia for which she received IV iron in November 2015.  She had a CT c/a/p, EGD and colonoscopy, all of which were reportedly normal. She states she has had iron deficiency in the past but never to this degree. No clear etiology of iron deficiency so far. 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 Sherice Ellison for evaluation of hypercoagulable state prior to surgery. Hypercoagulable w/u negative but given her history, prophylactic lovenox was recommended for 4 weeks postop.  [de-identified] : Moderately differentiated invasive ductal carcinoma [de-identified] : ER 80%, HI 90%, HER2 negative  [de-identified] : Genetics: CancerNext 32 Gene Panel from [facility]: Negative  [FreeTextEntry1] : Tamoxifen 8/2016- 9/2018; Anastrazole since 9/2018- 1/2019 exemestane 1/2019.  [de-identified] : Ms. BRE GORE is here for a follow up of left breast cancer on endocrine therapy since 8/2016. She tried AI x 2 after menopause but had to stop due to intolerable s/e.   She is tolerating tamoxifen well. Good compliance. She denies mood changes, wt gain, vaginal dryness, SOB, chest pain, leg swelling or clotting issues. Her energy and appetite is good, wt stable. Mild hot flashes are manageable.  She is postmenopausal. No PMB. GYN - Dr Hansen. Saw 8/2024 s/p BSO 2/2022- benign . Endometrial bx benign colonoscopy 12/2021- tubular adenoma, cecal  Breast imaging: not needed Opthal: She has glasses, seeing annually; last visit Feb 2024 Bone scan  2/2019: negative for mets  Dexa scan 5/2023: osteopenia in spine She has complete 5 yrs of endocrine therapy but we discussed that tamoxifen is typically for 10 yrs. Pt in agreement to continue x 10 yrs She is taking ASA 81 Anemia has improved. [IA] : IA

## 2024-10-04 NOTE — PHYSICAL EXAM
[Fully active, able to carry on all pre-disease performance without restriction] : Status 0 - Fully active, able to carry on all pre-disease performance without restriction [Normal] : affect appropriate [de-identified] : BL mastectomies with reconstruction, no chest wall or axillary nodules

## 2024-10-05 NOTE — BEGINNING OF VISIT
[0] : 1) Little interest or pleasure doing things: Not at all (0) [[id number]] : 0 [Pain Scale: ___] : On a scale of 1-10, today the patient's pain is a(n) [unfilled]. [Never] : Never [Date Discussed (MM/DD/YY): ___] : Discussed: [unfilled] [With Patient/Caregiver] : with Patient/Caregiver

## 2024-10-05 NOTE — BEGINNING OF VISIT
[0] : 1) Little interest or pleasure doing things: Not at all (0) [[id number]] : 0 [Pain Scale: ___] : On a scale of 1-10, today the patient's pain is a(n) [unfilled]. [Never] : Never [Date Discussed (MM/DD/YY): ___] : Discussed: [unfilled] [With Patient/Caregiver] : with Patient/Caregiver

## 2024-10-05 NOTE — BEGINNING OF VISIT
[0] : 1) Little interest or pleasure doing things: Not at all (0) [[id number]] : 0 [Pain Scale: ___] : On a scale of 1-10, today the patient's pain is a(n) [unfilled]. [Never] : Never [Date Discussed (MM/DD/YY): ___] : Discussed: [unfilled] [With Patient/Caregiver] : with Patient/Caregiver

## 2025-04-02 ENCOUNTER — OUTPATIENT (OUTPATIENT)
Dept: OUTPATIENT SERVICES | Facility: HOSPITAL | Age: 55
LOS: 1 days | Discharge: HOME | End: 2025-04-02

## 2025-04-02 DIAGNOSIS — C50.912 MALIGNANT NEOPLASM OF UNSPECIFIED SITE OF LEFT FEMALE BREAST: ICD-10-CM

## 2025-04-02 DIAGNOSIS — Z90.13 ACQUIRED ABSENCE OF BILATERAL BREASTS AND NIPPLES: Chronic | ICD-10-CM

## 2025-04-07 ENCOUNTER — APPOINTMENT (OUTPATIENT)
Dept: HEMATOLOGY ONCOLOGY | Facility: CLINIC | Age: 55
End: 2025-04-07
Payer: COMMERCIAL

## 2025-04-07 VITALS
BODY MASS INDEX: 29.73 KG/M2 | WEIGHT: 174.16 LBS | HEART RATE: 79 BPM | TEMPERATURE: 97.9 F | RESPIRATION RATE: 16 BRPM | HEIGHT: 63.98 IN | OXYGEN SATURATION: 96 % | SYSTOLIC BLOOD PRESSURE: 115 MMHG | DIASTOLIC BLOOD PRESSURE: 78 MMHG

## 2025-04-07 DIAGNOSIS — T45.1X5A FLUSHING: ICD-10-CM

## 2025-04-07 DIAGNOSIS — R23.2 FLUSHING: ICD-10-CM

## 2025-04-07 DIAGNOSIS — C50.919 MALIGNANT NEOPLASM OF UNSPECIFIED SITE OF UNSPECIFIED FEMALE BREAST: ICD-10-CM

## 2025-04-07 DIAGNOSIS — Z79.810 LONG TERM (CURRENT) USE OF SELECTIVE ESTROGEN RECEPTOR MODULATORS (SERMS): ICD-10-CM

## 2025-04-07 PROCEDURE — 99214 OFFICE O/P EST MOD 30 MIN: CPT

## 2025-04-07 PROCEDURE — G2211 COMPLEX E/M VISIT ADD ON: CPT | Mod: NC
